# Patient Record
Sex: FEMALE | Race: WHITE
[De-identification: names, ages, dates, MRNs, and addresses within clinical notes are randomized per-mention and may not be internally consistent; named-entity substitution may affect disease eponyms.]

---

## 2017-03-08 ENCOUNTER — APPOINTMENT (OUTPATIENT)
Dept: UROGYNECOLOGY | Facility: CLINIC | Age: 72
End: 2017-03-08

## 2017-03-08 VITALS
SYSTOLIC BLOOD PRESSURE: 160 MMHG | HEART RATE: 74 BPM | TEMPERATURE: 97.7 F | DIASTOLIC BLOOD PRESSURE: 80 MMHG | RESPIRATION RATE: 16 BRPM

## 2017-06-07 ENCOUNTER — APPOINTMENT (OUTPATIENT)
Dept: UROGYNECOLOGY | Facility: CLINIC | Age: 72
End: 2017-06-07

## 2017-06-07 VITALS
SYSTOLIC BLOOD PRESSURE: 160 MMHG | RESPIRATION RATE: 14 BRPM | HEART RATE: 70 BPM | DIASTOLIC BLOOD PRESSURE: 84 MMHG | TEMPERATURE: 98.1 F

## 2017-08-16 ENCOUNTER — APPOINTMENT (OUTPATIENT)
Dept: UROGYNECOLOGY | Facility: CLINIC | Age: 72
End: 2017-08-16
Payer: MEDICARE

## 2017-08-16 PROCEDURE — 99213 OFFICE O/P EST LOW 20 MIN: CPT

## 2017-11-15 ENCOUNTER — APPOINTMENT (OUTPATIENT)
Dept: UROGYNECOLOGY | Facility: CLINIC | Age: 72
End: 2017-11-15
Payer: MEDICARE

## 2017-11-15 PROCEDURE — 99213 OFFICE O/P EST LOW 20 MIN: CPT

## 2018-02-14 ENCOUNTER — APPOINTMENT (OUTPATIENT)
Dept: UROGYNECOLOGY | Facility: CLINIC | Age: 73
End: 2018-02-14
Payer: MEDICARE

## 2018-02-14 PROCEDURE — 99213 OFFICE O/P EST LOW 20 MIN: CPT

## 2018-05-16 ENCOUNTER — APPOINTMENT (OUTPATIENT)
Dept: UROGYNECOLOGY | Facility: CLINIC | Age: 73
End: 2018-05-16
Payer: MEDICARE

## 2018-05-16 PROCEDURE — 99213 OFFICE O/P EST LOW 20 MIN: CPT

## 2018-05-16 PROCEDURE — A4562: CPT

## 2018-08-15 ENCOUNTER — APPOINTMENT (OUTPATIENT)
Dept: UROGYNECOLOGY | Facility: CLINIC | Age: 73
End: 2018-08-15
Payer: MEDICARE

## 2018-08-15 PROCEDURE — 99213 OFFICE O/P EST LOW 20 MIN: CPT

## 2018-11-19 ENCOUNTER — APPOINTMENT (OUTPATIENT)
Dept: UROGYNECOLOGY | Facility: CLINIC | Age: 73
End: 2018-11-19
Payer: MEDICARE

## 2018-11-19 PROCEDURE — 99213 OFFICE O/P EST LOW 20 MIN: CPT

## 2019-02-20 ENCOUNTER — APPOINTMENT (OUTPATIENT)
Dept: UROGYNECOLOGY | Facility: CLINIC | Age: 74
End: 2019-02-20
Payer: MEDICARE

## 2019-02-20 PROCEDURE — 99213 OFFICE O/P EST LOW 20 MIN: CPT

## 2019-02-20 NOTE — DISCUSSION/SUMMARY
[FreeTextEntry1] : F/u 3 months or sooner if needed.  Instructed to call with any questions or concerns and she verbalizes understanding.\par

## 2019-02-20 NOTE — PHYSICAL EXAM
[No Acute Distress] : in no acute distress [Well developed] : well developed [Well Nourished] : ~L well nourished [Good Hygeine] : demonstrates good hygeine [Oriented x3] : oriented to person, place, and time [Normal Memory] : ~T memory was ~L unimpaired [Normal Mood/Affect] : mood and affect are normal [Soft, Nontender] : the abdomen was soft and nontender [Warm and Dry] : was warm and dry to touch [Normal Gait] : gait was normal [Normal Appearance] : general appearance was normal [Atrophy] : atrophy [Rectocele] : a rectocele [Cystocele] : a cystocele [Uterine Prolapse] : uterine prolapse [Discharge] : a  ~M vaginal discharge was present [Scant] : scant [White] : white [No Bleeding] : there was no active vaginal bleeding [Normal] : normal [de-identified] : + pessary dimple at 7:00

## 2019-02-20 NOTE — HISTORY OF PRESENT ILLNESS
[FreeTextEntry1] : Pt. very happy with pessary.  Denies any pelvic pain, pressure or vaginal bleeding.  Denies any problems with urination or BM.  No other changes since last visit.

## 2019-02-20 NOTE — PROCEDURE
[Good Fit] : fits well [Discharge] : there is vaginal discharge [Pessary Inserted] : inserted [Pessary Washed] : washed [H2O] : H2O [None] : no bleeding [Erosion] : no evidence of erosion [Erythema] : no erythema [Infection] : no evidence of infection [FreeTextEntry1] : tory  2 1/2

## 2019-05-22 ENCOUNTER — APPOINTMENT (OUTPATIENT)
Dept: UROGYNECOLOGY | Facility: CLINIC | Age: 74
End: 2019-05-22
Payer: MEDICARE

## 2019-05-22 PROCEDURE — 99213 OFFICE O/P EST LOW 20 MIN: CPT

## 2019-05-22 NOTE — DISCUSSION/SUMMARY
[FreeTextEntry1] : F/u in 3 months or sooner if needed.  Instructed to call with any questions or concerns and she verbalizes understanding.\par

## 2019-05-22 NOTE — PHYSICAL EXAM
[No Acute Distress] : in no acute distress [Well developed] : well developed [Well Nourished] : ~L well nourished [Good Hygeine] : demonstrates good hygeine [Oriented x3] : oriented to person, place, and time [Normal Memory] : ~T memory was ~L unimpaired [Normal Mood/Affect] : mood and affect are normal [Soft, Nontender] : the abdomen was soft and nontender [Warm and Dry] : was warm and dry to touch [Normal Gait] : gait was normal [Normal Appearance] : general appearance was normal [Rectocele] : a rectocele [Cystocele] : a cystocele [Uterine Prolapse] : uterine prolapse [Discharge] : a  ~M vaginal discharge was present [White] : white [Scant] : there was scant vaginal bleeding [Normal] : normal [FreeTextEntry4] : + irritations to right vaginal wall with scant bleeding

## 2019-05-22 NOTE — HISTORY OF PRESENT ILLNESS
[FreeTextEntry1] : Pt. happy with pessary.  reports good support.  Denies any pelvic pain, pressure or vaginal bleeding.  Denies any problems with urination or BM.  Does note some vaginal discharge, not bothersome.

## 2019-05-22 NOTE — PROCEDURE
[Good Fit] : fits well [Erythema] : erythema noted [Discharge] : there is vaginal discharge [Pessary Inserted] : inserted [Pessary Washed] : washed [H2O] : H2O [Mild] : mild bleeding [Resolved w/pressure] : was resolved by applying pressure [Erosion] : no evidence of erosion [Infection] : no evidence of infection [FreeTextEntry1] : tory  2 1/2

## 2019-08-21 ENCOUNTER — APPOINTMENT (OUTPATIENT)
Dept: UROGYNECOLOGY | Facility: CLINIC | Age: 74
End: 2019-08-21
Payer: MEDICARE

## 2019-08-21 PROCEDURE — 99213 OFFICE O/P EST LOW 20 MIN: CPT

## 2019-08-21 NOTE — HISTORY OF PRESENT ILLNESS
[FreeTextEntry1] : Pt. very happy with pessary.  Reports excellent support.  Denies any pelvic pain, pressure or vaginal bleeding.  Denies any problems with urination or BM.

## 2019-08-21 NOTE — DISCUSSION/SUMMARY
[FreeTextEntry1] : F.u 3 months or sooner if needed.  Instructed to call with any questions or concerns and she verbalizes understanding.\par

## 2019-08-21 NOTE — PHYSICAL EXAM
[No Acute Distress] : in no acute distress [Well developed] : well developed [Well Nourished] : ~L well nourished [Good Hygeine] : demonstrates good hygeine [Oriented x3] : oriented to person, place, and time [Normal Memory] : ~T memory was ~L unimpaired [Normal Mood/Affect] : mood and affect are normal [Soft, Nontender] : the abdomen was soft and nontender [Warm and Dry] : was warm and dry to touch [Normal Gait] : gait was normal [Normal Appearance] : general appearance was normal [Rectocele] : a rectocele [Cystocele] : a cystocele [Uterine Prolapse] : uterine prolapse [Discharge] : a  ~M vaginal discharge was present [Scant] : scant [White] : white [No Bleeding] : there was no active vaginal bleeding [Normal] : normal

## 2019-08-21 NOTE — PROCEDURE
[Good Fit] : fits well [Discharge] : there is vaginal discharge [Pessary Inserted] : inserted [H2O] : H2O [Pessary Washed] : washed [None] : no bleeding [Erosion] : no evidence of erosion [Erythema] : no erythema [Infection] : no evidence of infection [FreeTextEntry1] : tory  2 1/2

## 2019-11-20 ENCOUNTER — APPOINTMENT (OUTPATIENT)
Dept: UROGYNECOLOGY | Facility: CLINIC | Age: 74
End: 2019-11-20
Payer: MEDICARE

## 2019-11-20 PROCEDURE — 99213 OFFICE O/P EST LOW 20 MIN: CPT

## 2019-11-20 NOTE — HISTORY OF PRESENT ILLNESS
[FreeTextEntry1] : Pt very happy with pessary.  Denies any pelvic pain, pressure or vaginal bleeding.  Denies any problems with urination or BM.  Denies any other changes since last visit.

## 2019-11-20 NOTE — PHYSICAL EXAM
[No Acute Distress] : in no acute distress [Well developed] : well developed [Well Nourished] : ~L well nourished [Oriented x3] : oriented to person, place, and time [Good Hygeine] : demonstrates good hygeine [Normal Mood/Affect] : mood and affect are normal [Normal Memory] : ~T memory was ~L unimpaired [Warm and Dry] : was warm and dry to touch [Soft, Nontender] : the abdomen was soft and nontender [Normal Gait] : gait was normal [Normal Appearance] : general appearance was normal [Rectocele] : a rectocele [Cystocele] : a cystocele [Discharge] : a  ~M vaginal discharge was present [Uterine Prolapse] : uterine prolapse [Scant] : scant [White] : white [Normal] : normal [No Bleeding] : there was no active vaginal bleeding

## 2019-11-20 NOTE — PROCEDURE
[Good Fit] : fits well [Discharge] : there is vaginal discharge [Pessary Inserted] : inserted [Pessary Washed] : washed [None] : no bleeding [H2O] : H2O [Erosion] : no evidence of erosion [Erythema] : no erythema [Infection] : no evidence of infection [FreeTextEntry1] : tory  2 1/2

## 2020-02-24 ENCOUNTER — APPOINTMENT (OUTPATIENT)
Dept: UROGYNECOLOGY | Facility: CLINIC | Age: 75
End: 2020-02-24
Payer: MEDICARE

## 2020-02-24 PROCEDURE — 99213 OFFICE O/P EST LOW 20 MIN: CPT

## 2020-02-24 NOTE — HISTORY OF PRESENT ILLNESS
[FreeTextEntry1] : Pt very happy with pessary.  Denies any pelvic pain, pressure or vaginal bleeding.  Denies any problems with urination or BM.  She is happy with support and denies any other changes since last visit.

## 2020-02-24 NOTE — PHYSICAL EXAM
[No Acute Distress] : in no acute distress [Well developed] : well developed [Well Nourished] : ~L well nourished [Good Hygeine] : demonstrates good hygeine [Oriented x3] : oriented to person, place, and time [Normal Memory] : ~T memory was ~L unimpaired [Normal Mood/Affect] : mood and affect are normal [Soft, Nontender] : the abdomen was soft and nontender [Warm and Dry] : was warm and dry to touch [Normal Gait] : gait was normal [Normal Appearance] : general appearance was normal [Atrophy] : atrophy [Rectocele] : a rectocele [Cystocele] : a cystocele [Uterine Prolapse] : uterine prolapse [Scant] : scant [Discharge] : a  ~M vaginal discharge was present [White] : white [No Bleeding] : there was no active vaginal bleeding [Normal] : normal

## 2020-02-24 NOTE — PROCEDURE
[Good Fit] : fits well [Discharge] : there is vaginal discharge [Pessary Inserted] : inserted [Pessary Washed] : washed [H2O] : H2O [None] : no bleeding [Erosion] : no evidence of erosion [Erythema] : no erythema [Infection] : no evidence of infection [FreeTextEntry1] : Luis A  2 1/2

## 2020-05-25 ENCOUNTER — NON-APPOINTMENT (OUTPATIENT)
Age: 75
End: 2020-05-25

## 2020-05-26 ENCOUNTER — APPOINTMENT (OUTPATIENT)
Dept: UROGYNECOLOGY | Facility: CLINIC | Age: 75
End: 2020-05-26
Payer: MEDICARE

## 2020-05-26 VITALS
DIASTOLIC BLOOD PRESSURE: 100 MMHG | HEART RATE: 80 BPM | RESPIRATION RATE: 100 BRPM | SYSTOLIC BLOOD PRESSURE: 150 MMHG | HEIGHT: 60 IN | TEMPERATURE: 97.6 F

## 2020-05-26 DIAGNOSIS — N93.9 ABNORMAL UTERINE AND VAGINAL BLEEDING, UNSPECIFIED: ICD-10-CM

## 2020-05-26 DIAGNOSIS — N36.2 URETHRAL CARUNCLE: ICD-10-CM

## 2020-05-26 PROCEDURE — 99213 OFFICE O/P EST LOW 20 MIN: CPT

## 2020-05-26 RX ORDER — ESTRADIOL 0.1 MG/G
0.1 CREAM VAGINAL
Qty: 1 | Refills: 0 | Status: ACTIVE | COMMUNITY
Start: 2020-05-26 | End: 1900-01-01

## 2020-05-26 NOTE — PHYSICAL EXAM
[No Acute Distress] : in no acute distress [Well developed] : well developed [Well Nourished] : ~L well nourished [Good Hygeine] : demonstrates good hygeine [Oriented x3] : oriented to person, place, and time [Normal Memory] : ~T memory was ~L unimpaired [Normal Mood/Affect] : mood and affect are normal [Soft, Nontender] : the abdomen was soft and nontender [Normal Gait] : gait was normal [Warm and Dry] : was warm and dry to touch [Normal Appearance] : general appearance was normal [Atrophy] : atrophy [Rectocele] : a rectocele [Cystocele] : a cystocele [Uterine Prolapse] : uterine prolapse [Discharge] : a  ~M vaginal discharge was present [Scant] : scant [White] : white [No Bleeding] : there was no active vaginal bleeding [Normal] : normal [Chaperone Present] : A chaperone was present in the examining room during all aspects of the physical examination

## 2020-05-26 NOTE — DISCUSSION/SUMMARY
[FreeTextEntry1] : \par No evidence of erosion on exam today. Urethral caruncle present. Will start vaginal estrogen for caruncle and obtain pelvic ultrasound to assess uterus. F/u for removal of pessary prior to US.

## 2020-05-26 NOTE — PROCEDURE
[Good Fit] : fits well [Discharge] : there is vaginal discharge [Pessary Inserted] : inserted [H2O] : H2O [Pessary Washed] : washed [None] : no bleeding [Refit] : refit is not needed [Erosion] : no evidence of erosion [Erythema] : no erythema [Infection] : no evidence of infection [FreeTextEntry1] : Luis A  2 1/2

## 2020-05-26 NOTE — HISTORY OF PRESENT ILLNESS
[FreeTextEntry1] : \kanika Smith has a GH LS 2 1/2. She called yesterday with 2 episodes of vaginal bleeding. Denies dysuria, incomplete emptying, constipation. Pessary was last checked on 2/24/20. Denies any pelvic pain, pressure. Denies any problems with urination or BM. She is happy with support and denies any other changes since last visit.

## 2020-06-03 ENCOUNTER — RESULT REVIEW (OUTPATIENT)
Age: 75
End: 2020-06-03

## 2020-06-03 ENCOUNTER — APPOINTMENT (OUTPATIENT)
Dept: ULTRASOUND IMAGING | Facility: CLINIC | Age: 75
End: 2020-06-03

## 2020-06-03 ENCOUNTER — OUTPATIENT (OUTPATIENT)
Dept: OUTPATIENT SERVICES | Facility: HOSPITAL | Age: 75
LOS: 1 days | End: 2020-06-03
Payer: MEDICARE

## 2020-06-03 ENCOUNTER — APPOINTMENT (OUTPATIENT)
Dept: UROGYNECOLOGY | Facility: CLINIC | Age: 75
End: 2020-06-03
Payer: MEDICARE

## 2020-06-03 VITALS — TEMPERATURE: 97.8 F

## 2020-06-03 DIAGNOSIS — Z00.8 ENCOUNTER FOR OTHER GENERAL EXAMINATION: ICD-10-CM

## 2020-06-03 PROCEDURE — 76830 TRANSVAGINAL US NON-OB: CPT | Mod: 26

## 2020-06-03 PROCEDURE — 99213 OFFICE O/P EST LOW 20 MIN: CPT

## 2020-06-03 PROCEDURE — 76856 US EXAM PELVIC COMPLETE: CPT

## 2020-06-03 PROCEDURE — 76830 TRANSVAGINAL US NON-OB: CPT

## 2020-06-03 PROCEDURE — 76856 US EXAM PELVIC COMPLETE: CPT | Mod: 26

## 2020-06-04 ENCOUNTER — APPOINTMENT (OUTPATIENT)
Dept: UROGYNECOLOGY | Facility: CLINIC | Age: 75
End: 2020-06-04
Payer: MEDICARE

## 2020-06-04 PROCEDURE — 99213 OFFICE O/P EST LOW 20 MIN: CPT

## 2020-06-04 NOTE — PHYSICAL EXAM
[No Acute Distress] : in no acute distress [Well developed] : well developed [Well Nourished] : ~L well nourished [Good Hygeine] : demonstrates good hygeine [Oriented x3] : oriented to person, place, and time [Normal Memory] : ~T memory was ~L unimpaired [Normal Mood/Affect] : mood and affect are normal [Warm and Dry] : was warm and dry to touch [Normal Gait] : gait was normal [Labia Majora] : were normal [Normal] : was normal [Labia Minora] : were normal [Normal Appearance] : general appearance was normal [Atrophy] : atrophy [No Bleeding] : there was no active vaginal bleeding [Anxiety] : patient is not anxious [Tenderness] : ~T no ~M abdominal tenderness observed [Distended] : not distended

## 2020-06-04 NOTE — PROCEDURE
[Good Fit] : fits well [Pessary Out] : removed [H2O] : H2O [None] : no bleeding [Refit] : refit is not needed [Erosion] : no evidence of erosion [Erythema] : no erythema [Discharge] : no vaginal discharge [Infection] : no evidence of infection [FreeTextEntry1] : GH 2 1/2 LS [FreeTextEntry8] : routine mary carmen-care

## 2020-06-04 NOTE — DISCUSSION/SUMMARY
[FreeTextEntry1] : Pessary left out today for transvaginal sonogram to be done later today. She will RTO later this week for pessary to be reinserted.  Pt agrees to call office with any problems/concerns.

## 2020-06-04 NOTE — PROCEDURE
[Good Fit] : fits well [Pessary Inserted] : inserted [H2O] : H2O [None] : no bleeding [Refit] : refit is not needed [Erosion] : no evidence of erosion [Erythema] : no erythema [Discharge] : no vaginal discharge [Infection] : no evidence of infection [FreeTextEntry1] :  2 1/2 LS (inserted today) [FreeTextEntry8] : routine mary carmen-care

## 2020-06-04 NOTE — DISCUSSION/SUMMARY
[FreeTextEntry1] : Discussed results of pelvic sonogram with patient. She is aware that due to the endometrial lining of 13 mm, she will need an endometrial biopsy.  She will RTO for endometrial biopsy as soon as possible.  She will hold off on using low dose vaginal estrogen until biopsy results return. \par \par Pessary reinserted. Pt comfortable with pessary.  She will RTO in 3 months or sooner if needed for f/u of prolapse.\par \par Pt agrees to call office with any problems/concerns.

## 2020-06-04 NOTE — HISTORY OF PRESENT ILLNESS
[FreeTextEntry1] : Pt presents to office for pessary reinsertion.  Pessary was removed for transvaginal sonogram to be done.  Sonogram was done yesterday (6/3/20) and endometrial lining was found to be 13 mm.

## 2020-06-04 NOTE — PHYSICAL EXAM
[Well developed] : well developed [No Acute Distress] : in no acute distress [Well Nourished] : ~L well nourished [Oriented x3] : oriented to person, place, and time [Good Hygeine] : demonstrates good hygeine [Normal Mood/Affect] : mood and affect are normal [Normal Memory] : ~T memory was ~L unimpaired [Warm and Dry] : was warm and dry to touch [Normal Gait] : gait was normal [Labia Majora] : were normal [Labia Minora] : were normal [Normal] : was normal [Normal Appearance] : general appearance was normal [Atrophy] : atrophy [No Bleeding] : there was no active vaginal bleeding [Anxiety] : patient is not anxious [Tenderness] : ~T no ~M abdominal tenderness observed [Distended] : not distended [de-identified] : cervical procidentia

## 2020-06-04 NOTE — HISTORY OF PRESENT ILLNESS
[FreeTextEntry1] : Pt presents to office for removal of pessary prior to transvaginal sonogram to evaluate endometrial lining.  Pt reported 2 separate episodes of vaginal bleeding noted on pad.  Vaginal exam was normal and there were no vaginal irritations.  Pt did have a urethral caruncle, for which she started using low dose vaginal estrogen.  Denies any staining since that last 2 episodes.

## 2020-06-17 ENCOUNTER — APPOINTMENT (OUTPATIENT)
Dept: UROGYNECOLOGY | Facility: CLINIC | Age: 75
End: 2020-06-17
Payer: MEDICARE

## 2020-06-17 ENCOUNTER — OUTPATIENT (OUTPATIENT)
Dept: OUTPATIENT SERVICES | Facility: HOSPITAL | Age: 75
LOS: 1 days | End: 2020-06-17
Payer: MEDICARE

## 2020-06-17 DIAGNOSIS — Z01.818 ENCOUNTER FOR OTHER PREPROCEDURAL EXAMINATION: ICD-10-CM

## 2020-06-17 PROCEDURE — 58100 BIOPSY OF UTERUS LINING: CPT

## 2020-06-19 RX ORDER — DOXYCYCLINE HYCLATE 100 MG/1
100 TABLET ORAL
Qty: 20 | Refills: 0 | Status: ACTIVE | COMMUNITY
Start: 2020-06-19 | End: 1900-01-01

## 2020-06-22 LAB — CORE LAB BIOPSY: NORMAL

## 2020-06-24 ENCOUNTER — APPOINTMENT (OUTPATIENT)
Dept: UROGYNECOLOGY | Facility: CLINIC | Age: 75
End: 2020-06-24

## 2020-07-01 ENCOUNTER — APPOINTMENT (OUTPATIENT)
Dept: UROGYNECOLOGY | Facility: CLINIC | Age: 75
End: 2020-07-01
Payer: MEDICARE

## 2020-07-01 VITALS
DIASTOLIC BLOOD PRESSURE: 103 MMHG | HEART RATE: 69 BPM | TEMPERATURE: 97.6 F | OXYGEN SATURATION: 99 % | SYSTOLIC BLOOD PRESSURE: 178 MMHG

## 2020-07-01 PROCEDURE — 99214 OFFICE O/P EST MOD 30 MIN: CPT

## 2020-07-01 NOTE — PROCEDURE
[Good Fit] : fits well [Pessary Inserted] : inserted [H2O] : H2O [None] : no bleeding [Erosion] : no evidence of erosion [Refit] : refit is not needed [Erythema] : no erythema [Discharge] : no vaginal discharge [FreeTextEntry1] : GH 2 1/2 LS [FreeTextEntry8] : routine mary carmen-care

## 2020-07-01 NOTE — PHYSICAL EXAM
[No Acute Distress] : in no acute distress [Well developed] : well developed [Well Nourished] : ~L well nourished [Good Hygeine] : demonstrates good hygeine [Oriented x3] : oriented to person, place, and time [Normal Memory] : ~T memory was ~L unimpaired [Normal Mood/Affect] : mood and affect are normal [Warm and Dry] : was warm and dry to touch [Normal Gait] : gait was normal [Labia Minora] : were normal [Labia Majora] : were normal [Normal] : was normal [Normal Appearance] : general appearance was normal [Atrophy] : atrophy [No Bleeding] : there was no active vaginal bleeding [Anxiety] : patient is not anxious [Tenderness] : ~T no ~M abdominal tenderness observed [Distended] : not distended

## 2020-07-01 NOTE — DISCUSSION/SUMMARY
[FreeTextEntry1] : Pessary inserted today.  Will plan for repeat pelvic sonogram for f/u of endometrial lining.  She will have repeat pelvic sonogram in 1 month.  She will RTO in 1 month for pessary removal prior to pelvic sonogram or sooner if needed. Pt agrees to call office with any problems/concerns.

## 2020-07-01 NOTE — HISTORY OF PRESENT ILLNESS
[FreeTextEntry1] : Pt presents to office for pessary reinsertion.  Pessary left out due to vaginal bleeding.  Pelvic sonogram done 6/3/20, showed thickened endometrial lining (13 mm) possibly due to endometritis. she is s/p 10 days of Doxycycline 100 mg BID.  Repeat pelvic sonogram done yesterday and lining is down to 6 mm.  Incidentally, b/l grade 2-3 hydronephrosis noted which resolved after pt voided.

## 2020-08-04 ENCOUNTER — APPOINTMENT (OUTPATIENT)
Dept: UROGYNECOLOGY | Facility: CLINIC | Age: 75
End: 2020-08-04
Payer: MEDICARE

## 2020-08-04 DIAGNOSIS — R93.89 ABNORMAL FINDINGS ON DIAGNOSTIC IMAGING OF OTHER SPECIFIED BODY STRUCTURES: ICD-10-CM

## 2020-08-04 PROCEDURE — 99213 OFFICE O/P EST LOW 20 MIN: CPT

## 2020-08-07 NOTE — PHYSICAL EXAM
[No Acute Distress] : in no acute distress [Well developed] : well developed [Well Nourished] : ~L well nourished [Good Hygeine] : demonstrates good hygeine [Oriented x3] : oriented to person, place, and time [Normal Memory] : ~T memory was ~L unimpaired [Normal Mood/Affect] : mood and affect are normal [Warm and Dry] : was warm and dry to touch [Normal Gait] : gait was normal [Labia Majora] : were normal [Labia Minora] : were normal [Normal] : was normal [Normal Appearance] : general appearance was normal [Atrophy] : atrophy [No Bleeding] : there was no active vaginal bleeding [Distended] : not distended [Tenderness] : ~T no ~M abdominal tenderness observed [Anxiety] : patient is not anxious

## 2020-08-07 NOTE — PROCEDURE
[Good Fit] : fits well [Pessary Out] : removed [H2O] : H2O [None] : no bleeding [Erosion] : no evidence of erosion [Refit] : refit is not needed [Discharge] : no vaginal discharge [Erythema] : no erythema [Infection] : no evidence of infection [FreeTextEntry1] : GH 2 1/2 LS [FreeTextEntry8] : routine mary carmen-care

## 2020-08-07 NOTE — DISCUSSION/SUMMARY
[FreeTextEntry1] : Pessary left out today for transvaginal sonogram.  She will RTO for insertion later today.  She agrees to call office with any problems/concerns.

## 2020-08-07 NOTE — HISTORY OF PRESENT ILLNESS
[FreeTextEntry1] : Pt presents to office for removal of pessary prior to pelvic sonogram.  She is having a pelvic sonogram to f/u on endometrial lining thickness, s/p course of doxycycline for endometritis.

## 2020-08-28 ENCOUNTER — APPOINTMENT (OUTPATIENT)
Dept: UROGYNECOLOGY | Facility: CLINIC | Age: 75
End: 2020-08-28

## 2020-11-06 ENCOUNTER — APPOINTMENT (OUTPATIENT)
Dept: UROGYNECOLOGY | Facility: CLINIC | Age: 75
End: 2020-11-06

## 2020-11-09 ENCOUNTER — APPOINTMENT (OUTPATIENT)
Dept: UROGYNECOLOGY | Facility: CLINIC | Age: 75
End: 2020-11-09
Payer: MEDICARE

## 2020-11-09 PROCEDURE — 99213 OFFICE O/P EST LOW 20 MIN: CPT

## 2020-11-09 NOTE — HISTORY OF PRESENT ILLNESS
[FreeTextEntry1] : Pt w/ known hx POP supported by RHONDA LS # 2 1/2 pessary presents to office today for routine f/u care.  Pt is happy with support provided by pessary.  Pt states occasionally will feel pessary slipping down, but it spontaneously moves back up when she sits or rests without any issues.  Feels empty after voids.  Denies any constipation.

## 2020-11-09 NOTE — END OF VISIT
[FreeTextEntry3] : I have reviewed the above and agree with all pertinent clinical information including history and physical examination and agree with treatment plan.\par

## 2020-11-09 NOTE — PHYSICAL EXAM
[No Acute Distress] : in no acute distress [Well developed] : well developed [Well Nourished] : ~L well nourished [Good Hygeine] : demonstrates good hygeine [Oriented x3] : oriented to person, place, and time [Normal Mood/Affect] : mood and affect are normal [Warm and Dry] : was warm and dry to touch [Normal Gait] : gait was normal [No Invol. Movements] : no involuntary movements were seen [Labia Majora] : were normal [Labia Minora] : were normal [Rectocele] : a rectocele [Cystocele] : a cystocele [Uterine Prolapse] : uterine prolapse [Discharge] : a  ~M vaginal discharge was present [Scant] : scant [White] : white [Thin] : thin [Mucoid] : mucoid [No Bleeding] : there was no active vaginal bleeding [Anxiety] : patient is not anxious [Tenderness] : ~T no ~M abdominal tenderness observed [Distended] : not distended [Foul Smelling] : not foul smelling [de-identified] : no visible prolapse or pessary bulging at introitus [FreeTextEntry4] : pessary intact in posterior vault

## 2020-11-09 NOTE — DISCUSSION/SUMMARY
[FreeTextEntry1] : x12 weeks f/u POP and pessary care or sooner prn\par Advised avoid constipation/straining w/ daily fiber/fluid intake and stool softeners daily PRN\par Instructed pt to call the office if any problems or concerns and she verbalized understanding.\par

## 2020-11-09 NOTE — PROCEDURE
[Good Fit] : fits well [Discharge] : there is vaginal discharge [Pessary Inserted] : inserted [Pessary Washed] : washed [H2O] : H2O [Medication Review] : Medicaiton Review: Patient verbalizes understanding of risks and benefits [Fluid Management] : Fluid Management: patient verbalizes understanding 6-10 cups per day [Bowel Management] : Bowel Management: patient verbalizes understanding of daily dietary fiber intake [Bladder Training] : Bladder Training: Patient given information with verbal understanding [Refit] : refit is not needed [Erosion] : no evidence of erosion [Erythema] : no erythema [Infection] : no evidence of infection [FreeTextEntry1] : RHONDA GIBBS # 2 1/2 [de-identified] : scant white leukorrhea [FreeTextEntry4] : Advised avoid constipation/straining w/ daily fiber/fluid intake and stool softeners daily PRN [FreeTextEntry8] : Con't daily proper pericare

## 2020-11-30 ENCOUNTER — APPOINTMENT (OUTPATIENT)
Dept: UROGYNECOLOGY | Facility: CLINIC | Age: 75
End: 2020-11-30
Payer: MEDICARE

## 2020-11-30 DIAGNOSIS — N39.3 STRESS INCONTINENCE (FEMALE) (MALE): ICD-10-CM

## 2020-11-30 PROCEDURE — 99213 OFFICE O/P EST LOW 20 MIN: CPT

## 2020-11-30 RX ORDER — AMLODIPINE BESYLATE 5 MG/1
5 TABLET ORAL
Qty: 90 | Refills: 0 | Status: DISCONTINUED | COMMUNITY
Start: 2020-10-15

## 2020-11-30 RX ORDER — ENALAPRIL MALEATE 10 MG/1
10 TABLET ORAL
Qty: 90 | Refills: 0 | Status: DISCONTINUED | COMMUNITY
Start: 2019-11-13

## 2020-11-30 NOTE — PHYSICAL EXAM
[No Acute Distress] : in no acute distress [Well developed] : well developed [Well Nourished] : ~L well nourished [Good Hygeine] : demonstrates good hygeine [Oriented x3] : oriented to person, place, and time [Normal Mood/Affect] : mood and affect are normal [Warm and Dry] : was warm and dry to touch [Normal Gait] : gait was normal [No Invol. Movements] : no involuntary movements were seen [Vulvar Atrophy] : vulvar atrophy [Labia Majora] : were normal [Labia Minora] : were normal [Atrophy] : atrophy [Rectocele] : a rectocele [Cystocele] : a cystocele [Uterine Prolapse] : uterine prolapse [Discharge] : a  ~M vaginal discharge was present [Scant] : scant [White] : white [Thin] : thin [Mucoid] : mucoid [No Bleeding] : there was no active vaginal bleeding [Anxiety] : patient is not anxious [Tenderness] : ~T no ~M abdominal tenderness observed [Distended] : not distended [Foul Smelling] : not foul smelling [de-identified] : Yoania.

## 2020-11-30 NOTE — PROCEDURE
[Refit] : refit needed [Discharge] : there is vaginal discharge [Pessary Inserted] : inserted [Pessary Washed] : washed [H2O] : H2O [Medication Review] : Medicaiton Review: Patient verbalizes understanding of risks and benefits [Fluid Management] : Fluid Management: patient verbalizes understanding 6-10 cups per day [Bowel Management] : Bowel Management: patient verbalizes understanding of daily dietary fiber intake [Bladder Training] : Bladder Training: Patient given information with verbal understanding [Good Fit] : fit is not good [Erosion] : no evidence of erosion [Erythema] : no erythema [Infection] : no evidence of infection [FreeTextEntry1] : RHONDA GIBBS # 2 1/2 [de-identified] : Luis A LS # 2 1/2.   [de-identified] : Luis A GIBBS # 2 3/4. [de-identified] : scant white leukorrhea [FreeTextEntry3] : Luvena or Replens to vaginal area 2-3x a week. [FreeTextEntry4] : Advised avoid constipation/straining w/ daily fiber/fluid intake and stool.  PT may take softeners and Miralx daily [FreeTextEntry8] : Con't daily proper pericare

## 2020-11-30 NOTE — HISTORY OF PRESENT ILLNESS
[FreeTextEntry1] : Pt presents to the office to have her pessary reinserted at it had fallen out over the weekend.  PT was fitted with Gellhorn LS # 2 1/2.  She had a large BM.  Pt is constipated.  Not taking any BM regimen.  She is able to urinate.  Prolapse protruding out of vagina and uncomfortable.

## 2020-11-30 NOTE — DISCUSSION/SUMMARY
[FreeTextEntry1] : Fitted back with Gellhorn LS # 2 3/4.  Follow up in 2-3 months.  \par Advised avoid constipation/straining w/ daily fiber/fluid intake and stool softeners daily PRN\par Instructed pt to call the office if any problems or concerns and she verbalized understanding.\par

## 2021-02-09 ENCOUNTER — APPOINTMENT (OUTPATIENT)
Dept: UROGYNECOLOGY | Facility: CLINIC | Age: 76
End: 2021-02-09
Payer: MEDICARE

## 2021-02-09 VITALS — SYSTOLIC BLOOD PRESSURE: 180 MMHG | HEART RATE: 71 BPM | DIASTOLIC BLOOD PRESSURE: 91 MMHG

## 2021-02-09 VITALS — TEMPERATURE: 95.6 F

## 2021-02-09 PROCEDURE — 99214 OFFICE O/P EST MOD 30 MIN: CPT

## 2021-02-16 NOTE — PROCEDURE
[Pessary Inserted] : inserted [Pessary Washed] : washed [H2O] : H2O [Medication Review] : Medicaiton Review: Patient verbalizes understanding of risks and benefits [Fluid Management] : Fluid Management: patient verbalizes understanding 6-10 cups per day [Bowel Management] : Bowel Management: patient verbalizes understanding of daily dietary fiber intake [Bladder Training] : Bladder Training: Patient given information with verbal understanding [Mild] : mild bleeding [Resolved w/pressure] : was resolved by applying pressure [Refit] : refit is not needed [Good Fit] : fit is not good [Erosion] : no evidence of erosion [Erythema] : no erythema [Discharge] : no vaginal discharge [Infection] : no evidence of infection [de-identified] : from irritation at inferior forchette d/t removal of pessary, resolved w/ pressure applied using scopette [FreeTextEntry1] : RHONDA LS # 2 3/4 [FreeTextEntry3] : Luvena or Replens to vaginal area 2-3x a week. [FreeTextEntry4] : Advised avoid constipation/straining w/ daily fiber/fluid intake and stool.  PT may take softeners and Miralx daily [FreeTextEntry8] : Con't daily proper pericare

## 2021-02-16 NOTE — DISCUSSION/SUMMARY
[FreeTextEntry1] : x12 weeks f/u POP and pessary care or sooner prn\par Advised avoid constipation/straining w/ daily fiber/fluid intake and stool softeners daily PRN\par Elevated BP-pt did not take BP med yesterday and today.  Pt asymptomatic.  Advised pt further evaluation at ER and pt declined.  Pt states she is going straight home to take her BP medication and will repeat BP check w/ home monitor.  Instructed pt if BP persistently elevated or if any symptoms, must contact her PCP immediately or go to nearest ER and she verbalized understanding.\par Instructed pt to call the office if any problems or concerns and she verbalized understanding.\par

## 2021-02-16 NOTE — HISTORY OF PRESENT ILLNESS
[FreeTextEntry1] : Pt w/ known hx POP refitted with GH LS # 2 3/4 pessary presents to office today for routine f/u care.  Pt is happy with support provided by this pessary and denies any issues with it.  Feels empty after voids.  Denies any constipation with Colace PRN.  Pt reports she did not take BP medication yesterday b/c ran out of Rx and she picked up refill last night, but forgot to take this AM also.  Denies any HA, dizziness, LOC, visual changes, CP, SOB, Abd pain, N/V, parasthesias.  Pt states she "feels fine"

## 2021-02-16 NOTE — PHYSICAL EXAM
[No Acute Distress] : in no acute distress [Well developed] : well developed [Well Nourished] : ~L well nourished [Good Hygeine] : demonstrates good hygeine [Oriented x3] : oriented to person, place, and time [Normal Mood/Affect] : mood and affect are normal [Warm and Dry] : was warm and dry to touch [Normal Gait] : gait was normal [No Invol. Movements] : no involuntary movements were seen [Vulvar Atrophy] : vulvar atrophy [Labia Majora] : were normal [Labia Minora] : were normal [Atrophy] : atrophy [Rectocele] : a rectocele [Uterine Prolapse] : uterine prolapse [Cystocele] : a cystocele [Scant] : there was scant vaginal bleeding [Anxiety] : patient is not anxious [Tenderness] : ~T no ~M abdominal tenderness observed [Distended] : not distended [de-identified] : no visible prolapse or pessary bulging at introitus [FreeTextEntry4] : pessary intact in posterior vault

## 2021-05-11 ENCOUNTER — APPOINTMENT (OUTPATIENT)
Dept: UROGYNECOLOGY | Facility: CLINIC | Age: 76
End: 2021-05-11
Payer: MEDICARE

## 2021-05-11 VITALS — SYSTOLIC BLOOD PRESSURE: 109 MMHG | DIASTOLIC BLOOD PRESSURE: 78 MMHG | HEART RATE: 76 BPM | TEMPERATURE: 97.3 F

## 2021-05-11 PROCEDURE — 99213 OFFICE O/P EST LOW 20 MIN: CPT

## 2021-05-13 NOTE — HISTORY OF PRESENT ILLNESS
[FreeTextEntry1] : Pt w/ known hx POP supported by  LS # 2 3/4 pessary presents to office today for routine f/u care. Pt is happy with support provided by pessary and denies any issues with it. Feels empty after voids. Reports rare constipation and takes Miralax PRN with relief.

## 2021-05-13 NOTE — PROCEDURE
[Erythema] : erythema noted [Discharge] : there is vaginal discharge [Pessary Inserted] : inserted [Pessary Washed] : washed [H2O] : H2O [Medication Review] : Medicaiton Review: Patient verbalizes understanding of risks and benefits [Fluid Management] : Fluid Management: patient verbalizes understanding 6-10 cups per day [Bowel Management] : Bowel Management: patient verbalizes understanding of daily dietary fiber intake [Bladder Training] : Bladder Training: Patient given information with verbal understanding [Good Fit] : fit is not good [Refit] : refit is not needed [Erosion] : no evidence of erosion [Infection] : no evidence of infection [FreeTextEntry1] : RHONDA LS # 2 3/4 [de-identified] : mild at anterior cervical lip [de-identified] : scant white leukorrhea [FreeTextEntry3] : Luvena or Replens to vaginal area 2-3x a week. [FreeTextEntry4] : Advised avoid constipation/straining w/ daily fiber/fluid intake and stool.  PT may take softeners and Miralx daily [FreeTextEntry8] : Con't daily proper pericare

## 2021-05-13 NOTE — DISCUSSION/SUMMARY
[FreeTextEntry1] : x12 weeks f/u POP and pessary care or sooner prn\par Advised OTC Replens vaginal cream PV BIW HS PRN\par Advised avoid constipation/straining w/ daily fiber/fluid intake and stool softeners daily PRN\par Con't daily proper pericare\par Instructed pt to call the office if any problems or concerns and she verbalized understanding.\par

## 2021-05-13 NOTE — PHYSICAL EXAM
[No Acute Distress] : in no acute distress [Well developed] : well developed [Well Nourished] : ~L well nourished [Good Hygeine] : demonstrates good hygeine [Oriented x3] : oriented to person, place, and time [Normal Mood/Affect] : mood and affect are normal [Warm and Dry] : was warm and dry to touch [Normal Gait] : gait was normal [Vulvar Atrophy] : vulvar atrophy [Labia Majora] : were normal [Labia Minora] : were normal [Atrophy] : atrophy [Erythematous] : erythema [Rectocele] : a rectocele [Cystocele] : a cystocele [Uterine Prolapse] : uterine prolapse [Discharge] : a  ~M vaginal discharge was present [Scant] : scant [White] : white [Thin] : thin [Mucoid] : mucoid [Anxiety] : patient is not anxious [Tenderness] : ~T no ~M abdominal tenderness observed [Distended] : not distended [Foul Smelling] : not foul smelling [de-identified] : no visible prolapse or pessary bulging at introitus [FreeTextEntry4] : pessary intact in posterior vault

## 2021-08-09 ENCOUNTER — APPOINTMENT (OUTPATIENT)
Dept: UROGYNECOLOGY | Facility: CLINIC | Age: 76
End: 2021-08-09
Payer: MEDICARE

## 2021-08-09 VITALS — DIASTOLIC BLOOD PRESSURE: 92 MMHG | TEMPERATURE: 97 F | SYSTOLIC BLOOD PRESSURE: 167 MMHG

## 2021-08-09 DIAGNOSIS — Z46.89 ENCOUNTER FOR FITTING AND ADJUSTMENT OF OTHER SPECIFIED DEVICES: ICD-10-CM

## 2021-08-09 PROCEDURE — 99213 OFFICE O/P EST LOW 20 MIN: CPT

## 2021-08-09 NOTE — PROCEDURE
[Good Fit] : fit is not good [Refit] : refit is not needed [Erosion] : no evidence of erosion [Erythema] : no erythema [Discharge] : no vaginal discharge [Infection] : no evidence of infection [Pessary Inserted] : inserted [Pessary Washed] : washed [H2O] : H2O [None] : no bleeding [Medication Review] : Medicaiton Review: Patient verbalizes understanding of risks and benefits [Fluid Management] : Fluid Management: patient verbalizes understanding 6-10 cups per day [Bowel Management] : Bowel Management: patient verbalizes understanding of daily dietary fiber intake [Bladder Training] : Bladder Training: Patient given information with verbal understanding [FreeTextEntry1] : RHONDA LS # 2 3/4 [FreeTextEntry3] : Luvena or Replens PV HS 2x a week PRN [FreeTextEntry4] : Advised avoid constipation/straining w/ daily fiber/fluid intake and stool.  PT may take softeners and Miralx daily [FreeTextEntry8] : Con't daily proper pericare

## 2021-08-09 NOTE — DISCUSSION/SUMMARY
[FreeTextEntry1] : x12 weeks f/u POP and pessary care or sooner prn\par Advised OTC Replens vaginal cream PV BIW HS PRN\par Con't daily proper pericare\par Advised avoid constipation/straining w/ daily fiber/fluid intake and stool softeners daily PRN\par Instructed pt to call the office if any problems or concerns and she verbalized understanding.\par

## 2021-08-09 NOTE — PHYSICAL EXAM
[No Acute Distress] : in no acute distress [Well developed] : well developed [Well Nourished] : ~L well nourished [Good Hygeine] : demonstrates good hygeine [Oriented x3] : oriented to person, place, and time [Normal Mood/Affect] : mood and affect are normal [Anxiety] : patient is not anxious [Tenderness] : ~T no ~M abdominal tenderness observed [Distended] : not distended [Warm and Dry] : was warm and dry to touch [Normal Gait] : gait was normal [Vulvar Atrophy] : vulvar atrophy [Labia Majora] : were normal [Labia Minora] : were normal [Atrophy] : atrophy [Rectocele] : a rectocele [Cystocele] : a cystocele [Uterine Prolapse] : uterine prolapse [No Bleeding] : there was no active vaginal bleeding [de-identified] : no visible prolapse or pessary bulging at introitus [FreeTextEntry4] : pessary intact in posterior vault

## 2021-08-09 NOTE — HISTORY OF PRESENT ILLNESS
[FreeTextEntry1] : Pt w/ known hx POP refitted with GH LS # 2 3/4 pessary presents to office today for routine f/u care.  Pt is happy with support provided by this pessary and denies any issues with it.  Feels empty after voids.  Denies any constipation with daily fluid, fruits and vegetables and Colace PRN.  Pt had forgotten to purchase Replens vaginal cream as recommended at prior visit.

## 2021-10-16 ENCOUNTER — INPATIENT (INPATIENT)
Facility: HOSPITAL | Age: 76
LOS: 3 days | Discharge: HOME | DRG: 281 | End: 2021-10-20
Attending: INTERNAL MEDICINE | Admitting: INTERNAL MEDICINE
Payer: MEDICARE

## 2021-10-16 VITALS
DIASTOLIC BLOOD PRESSURE: 91 MMHG | TEMPERATURE: 98 F | HEART RATE: 113 BPM | OXYGEN SATURATION: 99 % | RESPIRATION RATE: 18 BRPM | HEIGHT: 60 IN | SYSTOLIC BLOOD PRESSURE: 124 MMHG | WEIGHT: 115.96 LBS

## 2021-10-16 DIAGNOSIS — I21.4 NON-ST ELEVATION (NSTEMI) MYOCARDIAL INFARCTION: ICD-10-CM

## 2021-10-16 LAB
ALBUMIN SERPL ELPH-MCNC: 4.8 G/DL — SIGNIFICANT CHANGE UP (ref 3.3–5)
ALP SERPL-CCNC: 49 U/L — SIGNIFICANT CHANGE UP (ref 40–120)
ALT FLD-CCNC: 24 U/L — SIGNIFICANT CHANGE UP (ref 10–45)
ANION GAP SERPL CALC-SCNC: 18 MMOL/L — HIGH (ref 5–17)
APTT BLD: 27.5 SEC — SIGNIFICANT CHANGE UP (ref 27.5–35.5)
APTT BLD: 65.7 SEC — HIGH (ref 27.5–35.5)
AST SERPL-CCNC: 43 U/L — HIGH (ref 10–40)
BASOPHILS # BLD AUTO: 0.03 K/UL — SIGNIFICANT CHANGE UP (ref 0–0.2)
BASOPHILS NFR BLD AUTO: 0.3 % — SIGNIFICANT CHANGE UP (ref 0–2)
BILIRUB SERPL-MCNC: 0.6 MG/DL — SIGNIFICANT CHANGE UP (ref 0.2–1.2)
BUN SERPL-MCNC: 24 MG/DL — HIGH (ref 7–23)
CALCIUM SERPL-MCNC: 9.8 MG/DL — SIGNIFICANT CHANGE UP (ref 8.4–10.5)
CHLORIDE SERPL-SCNC: 101 MMOL/L — SIGNIFICANT CHANGE UP (ref 96–108)
CK MB BLD-MCNC: 6.9 % — HIGH (ref 0–3.5)
CK MB CFR SERPL CALC: 10.2 NG/ML — HIGH (ref 0–3.8)
CK SERPL-CCNC: 148 U/L — SIGNIFICANT CHANGE UP (ref 25–170)
CO2 SERPL-SCNC: 20 MMOL/L — LOW (ref 22–31)
CREAT SERPL-MCNC: 0.84 MG/DL — SIGNIFICANT CHANGE UP (ref 0.5–1.3)
EOSINOPHIL # BLD AUTO: 0.01 K/UL — SIGNIFICANT CHANGE UP (ref 0–0.5)
EOSINOPHIL NFR BLD AUTO: 0.1 % — SIGNIFICANT CHANGE UP (ref 0–6)
GAS PNL BLDV: SIGNIFICANT CHANGE UP
GAS PNL BLDV: SIGNIFICANT CHANGE UP
GLUCOSE SERPL-MCNC: 110 MG/DL — HIGH (ref 70–99)
HCT VFR BLD CALC: 33.9 % — LOW (ref 34.5–45)
HCT VFR BLD CALC: 35.4 % — SIGNIFICANT CHANGE UP (ref 34.5–45)
HGB BLD-MCNC: 11.8 G/DL — SIGNIFICANT CHANGE UP (ref 11.5–15.5)
HGB BLD-MCNC: 12.2 G/DL — SIGNIFICANT CHANGE UP (ref 11.5–15.5)
IMM GRANULOCYTES NFR BLD AUTO: 0.4 % — SIGNIFICANT CHANGE UP (ref 0–1.5)
INR BLD: 0.96 RATIO — SIGNIFICANT CHANGE UP (ref 0.88–1.16)
LYMPHOCYTES # BLD AUTO: 1.92 K/UL — SIGNIFICANT CHANGE UP (ref 1–3.3)
LYMPHOCYTES # BLD AUTO: 18.6 % — SIGNIFICANT CHANGE UP (ref 13–44)
MAGNESIUM SERPL-MCNC: 2 MG/DL — SIGNIFICANT CHANGE UP (ref 1.6–2.6)
MCHC RBC-ENTMCNC: 32.2 PG — SIGNIFICANT CHANGE UP (ref 27–34)
MCHC RBC-ENTMCNC: 32.4 PG — SIGNIFICANT CHANGE UP (ref 27–34)
MCHC RBC-ENTMCNC: 34.5 GM/DL — SIGNIFICANT CHANGE UP (ref 32–36)
MCHC RBC-ENTMCNC: 34.8 GM/DL — SIGNIFICANT CHANGE UP (ref 32–36)
MCV RBC AUTO: 92.4 FL — SIGNIFICANT CHANGE UP (ref 80–100)
MCV RBC AUTO: 93.9 FL — SIGNIFICANT CHANGE UP (ref 80–100)
MONOCYTES # BLD AUTO: 0.57 K/UL — SIGNIFICANT CHANGE UP (ref 0–0.9)
MONOCYTES NFR BLD AUTO: 5.5 % — SIGNIFICANT CHANGE UP (ref 2–14)
NEUTROPHILS # BLD AUTO: 7.73 K/UL — HIGH (ref 1.8–7.4)
NEUTROPHILS NFR BLD AUTO: 75.1 % — SIGNIFICANT CHANGE UP (ref 43–77)
NRBC # BLD: 0 /100 WBCS — SIGNIFICANT CHANGE UP (ref 0–0)
NRBC # BLD: 0 /100 WBCS — SIGNIFICANT CHANGE UP (ref 0–0)
NT-PROBNP SERPL-SCNC: 7075 PG/ML — HIGH (ref 0–300)
PHOSPHATE SERPL-MCNC: 2.8 MG/DL — SIGNIFICANT CHANGE UP (ref 2.5–4.5)
PLATELET # BLD AUTO: 204 K/UL — SIGNIFICANT CHANGE UP (ref 150–400)
PLATELET # BLD AUTO: 218 K/UL — SIGNIFICANT CHANGE UP (ref 150–400)
POTASSIUM SERPL-MCNC: 4 MMOL/L — SIGNIFICANT CHANGE UP (ref 3.5–5.3)
POTASSIUM SERPL-SCNC: 4 MMOL/L — SIGNIFICANT CHANGE UP (ref 3.5–5.3)
PROT SERPL-MCNC: 6.9 G/DL — SIGNIFICANT CHANGE UP (ref 6–8.3)
PROTHROM AB SERPL-ACNC: 11.5 SEC — SIGNIFICANT CHANGE UP (ref 10.6–13.6)
RBC # BLD: 3.67 M/UL — LOW (ref 3.8–5.2)
RBC # BLD: 3.77 M/UL — LOW (ref 3.8–5.2)
RBC # FLD: 12.3 % — SIGNIFICANT CHANGE UP (ref 10.3–14.5)
RBC # FLD: 12.4 % — SIGNIFICANT CHANGE UP (ref 10.3–14.5)
SARS-COV-2 RNA SPEC QL NAA+PROBE: SIGNIFICANT CHANGE UP
SODIUM SERPL-SCNC: 139 MMOL/L — SIGNIFICANT CHANGE UP (ref 135–145)
TROPONIN T, HIGH SENSITIVITY RESULT: 323 NG/L — HIGH (ref 0–51)
TROPONIN T, HIGH SENSITIVITY RESULT: 324 NG/L — HIGH (ref 0–51)
TROPONIN T, HIGH SENSITIVITY RESULT: 439 NG/L — HIGH (ref 0–51)
WBC # BLD: 10.3 K/UL — SIGNIFICANT CHANGE UP (ref 3.8–10.5)
WBC # BLD: 9.39 K/UL — SIGNIFICANT CHANGE UP (ref 3.8–10.5)
WBC # FLD AUTO: 10.3 K/UL — SIGNIFICANT CHANGE UP (ref 3.8–10.5)
WBC # FLD AUTO: 9.39 K/UL — SIGNIFICANT CHANGE UP (ref 3.8–10.5)

## 2021-10-16 PROCEDURE — 93010 ELECTROCARDIOGRAM REPORT: CPT | Mod: GC

## 2021-10-16 PROCEDURE — 99291 CRITICAL CARE FIRST HOUR: CPT | Mod: GC

## 2021-10-16 PROCEDURE — 71045 X-RAY EXAM CHEST 1 VIEW: CPT | Mod: 26

## 2021-10-16 RX ORDER — TICAGRELOR 90 MG/1
180 TABLET ORAL ONCE
Refills: 0 | Status: COMPLETED | OUTPATIENT
Start: 2021-10-16 | End: 2021-10-16

## 2021-10-16 RX ORDER — ASPIRIN/CALCIUM CARB/MAGNESIUM 324 MG
324 TABLET ORAL ONCE
Refills: 0 | Status: COMPLETED | OUTPATIENT
Start: 2021-10-16 | End: 2021-10-16

## 2021-10-16 RX ORDER — HEPARIN SODIUM 5000 [USP'U]/ML
3200 INJECTION INTRAVENOUS; SUBCUTANEOUS EVERY 6 HOURS
Refills: 0 | Status: DISCONTINUED | OUTPATIENT
Start: 2021-10-16 | End: 2021-10-19

## 2021-10-16 RX ORDER — HEPARIN SODIUM 5000 [USP'U]/ML
3200 INJECTION INTRAVENOUS; SUBCUTANEOUS ONCE
Refills: 0 | Status: DISCONTINUED | OUTPATIENT
Start: 2021-10-16 | End: 2021-10-19

## 2021-10-16 RX ORDER — METOPROLOL TARTRATE 50 MG
25 TABLET ORAL DAILY
Refills: 0 | Status: DISCONTINUED | OUTPATIENT
Start: 2021-10-16 | End: 2021-10-19

## 2021-10-16 RX ORDER — SODIUM CHLORIDE 9 MG/ML
500 INJECTION, SOLUTION INTRAVENOUS ONCE
Refills: 0 | Status: COMPLETED | OUTPATIENT
Start: 2021-10-16 | End: 2021-10-16

## 2021-10-16 RX ORDER — HEPARIN SODIUM 5000 [USP'U]/ML
INJECTION INTRAVENOUS; SUBCUTANEOUS
Qty: 25000 | Refills: 0 | Status: DISCONTINUED | OUTPATIENT
Start: 2021-10-16 | End: 2021-10-18

## 2021-10-16 RX ORDER — ATORVASTATIN CALCIUM 80 MG/1
40 TABLET, FILM COATED ORAL AT BEDTIME
Refills: 0 | Status: DISCONTINUED | OUTPATIENT
Start: 2021-10-16 | End: 2021-10-20

## 2021-10-16 RX ORDER — TICAGRELOR 90 MG/1
90 TABLET ORAL EVERY 12 HOURS
Refills: 0 | Status: DISCONTINUED | OUTPATIENT
Start: 2021-10-16 | End: 2021-10-19

## 2021-10-16 RX ORDER — AMLODIPINE BESYLATE 2.5 MG/1
5 TABLET ORAL DAILY
Refills: 0 | Status: DISCONTINUED | OUTPATIENT
Start: 2021-10-16 | End: 2021-10-19

## 2021-10-16 RX ADMIN — SODIUM CHLORIDE 500 MILLILITER(S): 9 INJECTION, SOLUTION INTRAVENOUS at 12:14

## 2021-10-16 RX ADMIN — HEPARIN SODIUM 650 UNIT(S)/HR: 5000 INJECTION INTRAVENOUS; SUBCUTANEOUS at 22:57

## 2021-10-16 RX ADMIN — HEPARIN SODIUM 3200 UNIT(S): 5000 INJECTION INTRAVENOUS; SUBCUTANEOUS at 16:30

## 2021-10-16 RX ADMIN — HEPARIN SODIUM 650 UNIT(S)/HR: 5000 INJECTION INTRAVENOUS; SUBCUTANEOUS at 16:32

## 2021-10-16 RX ADMIN — TICAGRELOR 90 MILLIGRAM(S): 90 TABLET ORAL at 22:21

## 2021-10-16 RX ADMIN — Medication 324 MILLIGRAM(S): at 13:05

## 2021-10-16 RX ADMIN — TICAGRELOR 180 MILLIGRAM(S): 90 TABLET ORAL at 15:47

## 2021-10-16 NOTE — ED PROVIDER NOTE - PHYSICAL EXAMINATION
Gen: WDWN, NAD  HEENT: EOMI, no nasal discharge, mucous membranes moist  CV: 2+ radial pulses b/l  Resp: no accessory muscle use, no increased work of breathing  GI: Abdomen soft non-distended, NTTP  MSK: No open wounds, no bruising, no LE edema  Neuro: A&Ox4, following commands, moving all four extremities spontaneously  Psych: appropriate mood

## 2021-10-16 NOTE — ED PROVIDER NOTE - OBJECTIVE STATEMENT
77YO F hx of HTN p/w generalized weakness. 2d prior had 1x emesis, generalized weakness, then returned to baseline health except for b/l LE weakness. pt denies cp, sob. presented to clinic for f/u, ekg showing deep inverted T waves. pt has no cardiac hx, in past had holter monitor that was normal. no hx of smoking, no cardiac hx in family.

## 2021-10-16 NOTE — ED PROVIDER NOTE - ATTENDING CONTRIBUTION TO CARE
attending Mariza: 76yF h/o HTN p/w generalized weakness x 2 days. Initially with strong urge to defecate with large BM, followed by "indigestion" with nausea and one episode of vomiting. Seen by PMD today where she was found to have abnormal EKG. Denies chest pain, SOB, syncope. No smoking history. No family h/o CAD. EKG with deep inverted T waves anterolaterally. Will place on tele, obtain serial EKG, labs including trop, cardiology eval in ED

## 2021-10-16 NOTE — ED PROVIDER NOTE - CLINICAL SUMMARY MEDICAL DECISION MAKING FREE TEXT BOX
Valeria Chavarria MD, PGY-2: 77YO F hx of HTN p/w generalized weakness and recent EKG showing deeply inverted T waves, no cp or sob. VSS, PE unremarkable. consider atypical ACS, plan for basic labs, trop, ekg, likely cdu vs. admission for stress/echo.

## 2021-10-16 NOTE — ED ADULT NURSE NOTE - NS ED NURSE REPORT GIVEN TO FT
Pt received bed assignment. Pt aware. Report given to RNСветлана. VSS. Pt stable for transport. Chart given to charge desk. Will cont to monitor.

## 2021-10-16 NOTE — ED ADULT NURSE NOTE - OBJECTIVE STATEMENT
75 yo F with pmhx of   was sent in by her PMD after showing EKG changes. Pt had an episode of faintness on Thursday, 10/14, did not synopsize, but states she had frequent BMs and felt "off". Did get evaluated at the time. Pt is asymptomatic on arrival. denies chest pain, shortness of breath, headache, nausea, vomiting, diarrhea, abdominal pain, fevers, chills, urinary symptoms, hematuria, bloody stool, falls, traumas. Lungs cta, NSR on CCM, abd soft, nt/nd.

## 2021-10-16 NOTE — ED ADULT TRIAGE NOTE - CHIEF COMPLAINT QUOTE
went to doctor and was sent to ed; was feeling sweaty and like I had to have a bowel movement and lightheaded; doctor said ekg changes

## 2021-10-16 NOTE — H&P ADULT - NSHPLABSRESULTS_GEN_ALL_CORE
12.2   10.30 )-----------( 218      ( 16 Oct 2021 12:04 )             35.4       10-16    139  |  101  |  24<H>  ----------------------------<  110<H>  4.0   |  20<L>  |  0.84    Ca    9.8      16 Oct 2021 12:04  Phos  2.8     10-16  Mg     2.0     10-16    TPro  6.9  /  Alb  4.8  /  TBili  0.6  /  DBili  x   /  AST  43<H>  /  ALT  24  /  AlkPhos  49  10-16          < from: Xray Chest 1 View- PORTABLE-Urgent (Xray Chest 1 View- PORTABLE-Urgent .) (10.16.21 @ 13:21) >    IMPRESSION:  Clear lungs.    < end of copied text >    EKG SR T inv ant lat leads

## 2021-10-16 NOTE — H&P ADULT - ASSESSMENT
76 f with    Chest pain  - telemetry  - cardiac enzymes  - BB  - ASA  - Brillinta  - AC with Heparin  - Cardiology evaluation     HTN control    DVT prophylaxis    Further action as per clinical course     Ruben Suarez MD pager [phone]

## 2021-10-16 NOTE — H&P ADULT - NSHPPHYSICALEXAM_GEN_ALL_CORE
PHYSICAL EXAMINATION:  Vital Signs Last 24 Hrs  T(C): 36.7 (16 Oct 2021 12:18), Max: 36.7 (16 Oct 2021 12:18)  T(F): 98.1 (16 Oct 2021 12:18), Max: 98.1 (16 Oct 2021 12:18)  HR: 88 (16 Oct 2021 15:32) (88 - 113)  BP: 126/72 (16 Oct 2021 15:32) (122/95 - 126/72)  BP(mean): --  RR: 18 (16 Oct 2021 15:32) (18 - 18)  SpO2: 99% (16 Oct 2021 15:32) (99% - 99%)  CAPILLARY BLOOD GLUCOSE          GENERAL: NAD, well-groomed, well-developed  HEAD:  atraumatic, normocephalic  EYES: sclera anicteric  ENMT: mucous membranes moist  NECK: supple, No JVD  CHEST/LUNG: clear to auscultation bilaterally; no rales, rhonchi, or wheezing b/l  HEART: normal S1, S2  ABDOMEN: BS+, soft, ND, NT   EXTREMITIES:  pulses palpable; no clubbing, cyanosis, or edema b/l LEs  NEURO: awake, alert, interactive; moves all extremities  SKIN: no rashes or lesions

## 2021-10-16 NOTE — ED PROVIDER NOTE - PROGRESS NOTE DETAILS
Valeria Chavarria MD, PGY-2: spoke with cards, asa and brillinta ordered, getting weight for heparin

## 2021-10-16 NOTE — ED PROVIDER NOTE - NS ED ROS FT
Gen: Denies fevers  CV: Denies chest pain  Resp: Denies SOB, cough  GI: Denies nausea, vomiting  Msk: + b/l LE weakness  : Denies dysuria  all other ROS negative unless indicated in HPI

## 2021-10-16 NOTE — ED ADULT NURSE REASSESSMENT NOTE - NS ED NURSE REASSESS COMMENT FT1
intial bolus heparing dose 3200 units was given Second RN Monica at bedside to confirm, I accidentally clicked the PRN order of 3200 on zebra device. RN Sparkle on 4 mon made aware. pt had episode of Atrial Tachy on monitor, RN sparkle aware.

## 2021-10-16 NOTE — CONSULT NOTE ADULT - ASSESSMENT
75 yo F with hx of HTN was sent in from PCP with TWI in precordial leads after presenting to PCP with weakness, found to have troponin of 324.    #EKG changes  #Troponin elevation  - given that patient never had any chest pain, it's unconvincing for ACS, however, also no demand event that would potentially cause troponin elevation  - check CK, CKMB, and trend  - trend trop to peak  - STAT TTE communicated to echo lab  - EKG with TWI in precordial leads, DDx include Takotsubo's  - would start on aspirin and hold off on plavix  - start toprol 25 daily  - check lipids, a1c  - start atorvastatin 40 daily  - monitor on telemetry  - would need ischemic eval eventually   77 yo F with hx of HTN was sent in from PCP with TWI in precordial leads after presenting to PCP with weakness, found to have troponin of 324.    #EKG changes  #Troponin elevation  - given that patient never had any chest pain, it's unconvincing for ACS, however, also no demand event that would potentially cause troponin elevation  - check CK, CKMB, and trend  - trend trop to peak  - STAT TTE communicated to echo lab  - EKG with TWI in precordial leads, DDx include late presenting ACS and Takotsubo's  - would start on aspirin and hold off on plavix  - start toprol 25 daily  - check lipids, a1c  - start atorvastatin 40 daily  - monitor on telemetry  - would need ischemic eval eventually   75 yo F with hx of HTN was sent in from PCP with TWI in precordial leads after presenting to PCP with weakness, found to have troponin of 324.    #EKG changes  #Troponin elevation  - given that patient never had any chest pain, it's unconvincing for ACS, however, also no demand event that would potentially cause troponin elevation  - check CK, CKMB, and trend  - trend trop to peak  - STAT TTE communicated to echo lab  - EKG with TWI in precordial leads, DDx include late presenting ACS and Takotsubo's  - would DAPT load if not already done so, start heparin gtt  - start toprol 25 daily  - check lipids, a1c  - start atorvastatin 40 daily  - monitor on telemetry  - would need ischemic eval eventually

## 2021-10-16 NOTE — CONSULT NOTE ADULT - ATTENDING COMMENTS
Patient seen and examined. Agree with assessment and plan as outlined above. While story is not classic, ECG, trop and echo are concerning. While stress CM is in the differential, ACS must be excluded. Patient should have cardiac cath. She denied symptoms at the time of the interview. Continue GDMT for NSTEMI.

## 2021-10-16 NOTE — CONSULT NOTE ADULT - SUBJECTIVE AND OBJECTIVE BOX
Patient seen and evaluated at bedside    Chief Complaint: weakness    HPI:  77 yo F with hx of HTN presented with generalized weakness. 2 days ago she had 1 episode of emesis and returned to baseline health except for bilateral LE weakness. She went to her PCP today for check up and EKG showed TWI in the precordial leads with some LEONARD in v2. Patient denies chest pain, SOB. She has no known cardiac hx. In the past she has had a holter monitor that was normal. In the ED, her EKG again with TWI in precordial leads and her troponin was found to be 324.     PMHx:   HTN (hypertension)    Allergies:  No Known Allergies    REVIEW OF SYSTEMS:  Constitutional:     [x ] negative [ ] fevers [ ] chills [ ] weight loss [ ] weight gain  HEENT:                  [x ] negative [ ] dry eyes [ ] eye irritation [ ] postnasal drip [ ] nasal congestion  CV:                         [ x] negative  [ ] chest pain [ ] orthopnea [ ] palpitations [ ] murmur  Resp:                     [x ] negative [ ] cough [ ] shortness of breath [ ] dyspnea [ ] wheezing [ ] sputum [ ]hemoptysis  GI:                          [ x] negative [ ] nausea [ ] vomiting [ ] diarrhea [ ] constipation [ ] abd pain [ ] dysphagia   :                        [ x] negative [ ] dysuria [ ] nocturia [ ] hematuria [ ] increased urinary frequency  Musculoskeletal: [x ] negative [ ] back pain [ ] myalgias [ ] arthralgias [ ] fracture  Skin:                       [ x] negative [ ] rash [ ] itch  Neurological:        [ x] negative [ ] headache [ ] dizziness [ ] syncope [ ] weakness [ ] numbness  Psychiatric:           [ x] negative [ ] anxiety [ ] depression  Endocrine:            [ x] negative [ ] diabetes [ ] thyroid problem  Heme/Lymph:      [ x] negative [ ] anemia [ ] bleeding problem  Allergic/Immune: [ x] negative [ ] itchy eyes [ ] nasal discharge [ ] hives [ ] angioedema    [ x] All other systems negative  [ ] Unable to assess ROS due to    Physical Exam:  T(F): 98.1 (10-16), Max: 98.1 (10-16)  HR: 93 (10-16) (93 - 113)  BP: 122/95 (10-16) (122/95 - 124/91)  RR: 18 (10-16)  SpO2: 99% (10-16)  GENERAL: No acute distress, well-developed  HEAD:  Atraumatic, Normocephalic  ENT: EOMI, PERRLA, conjunctiva and sclera clear, Neck supple, No JVD, moist mucosa  CHEST/LUNG: Clear to auscultation bilaterally; No wheeze, equal breath sounds bilaterally   BACK: No spinal tenderness  HEART: Regular rate and rhythm; No murmurs, rubs, or gallops  ABDOMEN: Soft, Nontender, Nondistended; Bowel sounds present  EXTREMITIES:  No clubbing, cyanosis, or edema  PSYCH: Nl behavior, nl affect  NEUROLOGY: AAOx3, non-focal, cranial nerves intact  SKIN: Normal color, No rashes or lesions  LINES:    Cardiovascular Diagnostic Testing:    ECG: Personally reviewed:    Imaging:    CXR: Personally reviewed    Labs: Personally reviewed                        12.2   10.30 )-----------( 218      ( 16 Oct 2021 12:04 )             35.4     10-16    139  |  101  |  24<H>  ----------------------------<  110<H>  4.0   |  20<L>  |  0.84    Ca    9.8      16 Oct 2021 12:04  Phos  2.8     10-16  Mg     2.0     10-16    TPro  6.9  /  Alb  4.8  /  TBili  0.6  /  DBili  x   /  AST  43<H>  /  ALT  24  /  AlkPhos  49  10-16             Patient seen and evaluated at bedside    Chief Complaint: weakness    HPI:  77 yo F with hx of HTN presented with generalized weakness. 2 days ago she had 1 episode of emesis and returned to baseline health except for bilateral LE weakness. During that episode, she also felt diaphoretic and some abdominal discomfort, however, after she threw up, she was asymptomatic after about 3 mins. She went to her PCP today for check up and EKG showed TWI in the precordial leads with some LEONARD in v2. Patient denies chest pain, SOB. She has no known cardiac hx. In the past she has had a holter monitor that was normal. In the ED, her EKG again with TWI in precordial leads and her troponin was found to be 324.     PMHx:   HTN (hypertension)    Allergies:  No Known Allergies    REVIEW OF SYSTEMS:  Constitutional:     [x ] negative [ ] fevers [ ] chills [ ] weight loss [ ] weight gain  HEENT:                  [x ] negative [ ] dry eyes [ ] eye irritation [ ] postnasal drip [ ] nasal congestion  CV:                         [ x] negative  [ ] chest pain [ ] orthopnea [ ] palpitations [ ] murmur  Resp:                     [x ] negative [ ] cough [ ] shortness of breath [ ] dyspnea [ ] wheezing [ ] sputum [ ]hemoptysis  GI:                          [ x] negative [ ] nausea [ ] vomiting [ ] diarrhea [ ] constipation [ ] abd pain [ ] dysphagia   :                        [ x] negative [ ] dysuria [ ] nocturia [ ] hematuria [ ] increased urinary frequency  Musculoskeletal: [x ] negative [ ] back pain [ ] myalgias [ ] arthralgias [ ] fracture  Skin:                       [ x] negative [ ] rash [ ] itch  Neurological:        [ x] negative [ ] headache [ ] dizziness [ ] syncope [ ] weakness [ ] numbness  Psychiatric:           [ x] negative [ ] anxiety [ ] depression  Endocrine:            [ x] negative [ ] diabetes [ ] thyroid problem  Heme/Lymph:      [ x] negative [ ] anemia [ ] bleeding problem  Allergic/Immune: [ x] negative [ ] itchy eyes [ ] nasal discharge [ ] hives [ ] angioedema    [ x] All other systems negative  [ ] Unable to assess ROS due to    Physical Exam:  T(F): 98.1 (10-16), Max: 98.1 (10-16)  HR: 93 (10-16) (93 - 113)  BP: 122/95 (10-16) (122/95 - 124/91)  RR: 18 (10-16)  SpO2: 99% (10-16)  GENERAL: No acute distress, well-developed  HEAD:  Atraumatic, Normocephalic  ENT: EOMI, PERRLA, conjunctiva and sclera clear, Neck supple, No JVD, moist mucosa  CHEST/LUNG: Clear to auscultation bilaterally; No wheeze, equal breath sounds bilaterally   BACK: No spinal tenderness  HEART: Regular rate and rhythm; No murmurs, rubs, or gallops  ABDOMEN: Soft, Nontender, Nondistended; Bowel sounds present  EXTREMITIES:  No clubbing, cyanosis, or edema  PSYCH: Nl behavior, nl affect  NEUROLOGY: AAOx3, non-focal, cranial nerves intact  SKIN: Normal color, No rashes or lesions  LINES:    Cardiovascular Diagnostic Testing:    ECG: Personally reviewed:    Imaging:    CXR: Personally reviewed    Labs: Personally reviewed                        12.2   10.30 )-----------( 218      ( 16 Oct 2021 12:04 )             35.4     10-16    139  |  101  |  24<H>  ----------------------------<  110<H>  4.0   |  20<L>  |  0.84    Ca    9.8      16 Oct 2021 12:04  Phos  2.8     10-16  Mg     2.0     10-16    TPro  6.9  /  Alb  4.8  /  TBili  0.6  /  DBili  x   /  AST  43<H>  /  ALT  24  /  AlkPhos  49  10-16

## 2021-10-17 LAB
ANION GAP SERPL CALC-SCNC: 15 MMOL/L — SIGNIFICANT CHANGE UP (ref 5–17)
APTT BLD: 47.9 SEC — HIGH (ref 27.5–35.5)
APTT BLD: 54.1 SEC — HIGH (ref 27.5–35.5)
APTT BLD: 62.5 SEC — HIGH (ref 27.5–35.5)
BUN SERPL-MCNC: 15 MG/DL — SIGNIFICANT CHANGE UP (ref 7–23)
CALCIUM SERPL-MCNC: 9.2 MG/DL — SIGNIFICANT CHANGE UP (ref 8.4–10.5)
CHLORIDE SERPL-SCNC: 104 MMOL/L — SIGNIFICANT CHANGE UP (ref 96–108)
CO2 SERPL-SCNC: 21 MMOL/L — LOW (ref 22–31)
COVID-19 SPIKE DOMAIN AB INTERP: POSITIVE
COVID-19 SPIKE DOMAIN ANTIBODY RESULT: 125 U/ML — HIGH
CREAT SERPL-MCNC: 0.71 MG/DL — SIGNIFICANT CHANGE UP (ref 0.5–1.3)
GLUCOSE SERPL-MCNC: 107 MG/DL — HIGH (ref 70–99)
HCT VFR BLD CALC: 35.1 % — SIGNIFICANT CHANGE UP (ref 34.5–45)
HCV AB S/CO SERPL IA: 0.2 S/CO — SIGNIFICANT CHANGE UP (ref 0–0.99)
HCV AB SERPL-IMP: SIGNIFICANT CHANGE UP
HGB BLD-MCNC: 12.1 G/DL — SIGNIFICANT CHANGE UP (ref 11.5–15.5)
MCHC RBC-ENTMCNC: 32.4 PG — SIGNIFICANT CHANGE UP (ref 27–34)
MCHC RBC-ENTMCNC: 34.5 GM/DL — SIGNIFICANT CHANGE UP (ref 32–36)
MCV RBC AUTO: 94.1 FL — SIGNIFICANT CHANGE UP (ref 80–100)
NRBC # BLD: 0 /100 WBCS — SIGNIFICANT CHANGE UP (ref 0–0)
PLATELET # BLD AUTO: 213 K/UL — SIGNIFICANT CHANGE UP (ref 150–400)
POTASSIUM SERPL-MCNC: 3.9 MMOL/L — SIGNIFICANT CHANGE UP (ref 3.5–5.3)
POTASSIUM SERPL-SCNC: 3.9 MMOL/L — SIGNIFICANT CHANGE UP (ref 3.5–5.3)
RBC # BLD: 3.73 M/UL — LOW (ref 3.8–5.2)
RBC # FLD: 12.2 % — SIGNIFICANT CHANGE UP (ref 10.3–14.5)
SARS-COV-2 IGG+IGM SERPL QL IA: 125 U/ML — HIGH
SARS-COV-2 IGG+IGM SERPL QL IA: POSITIVE
SODIUM SERPL-SCNC: 140 MMOL/L — SIGNIFICANT CHANGE UP (ref 135–145)
TROPONIN T, HIGH SENSITIVITY RESULT: 436 NG/L — HIGH (ref 0–51)
WBC # BLD: 8.1 K/UL — SIGNIFICANT CHANGE UP (ref 3.8–10.5)
WBC # FLD AUTO: 8.1 K/UL — SIGNIFICANT CHANGE UP (ref 3.8–10.5)

## 2021-10-17 PROCEDURE — 99223 1ST HOSP IP/OBS HIGH 75: CPT | Mod: GC

## 2021-10-17 RX ADMIN — HEPARIN SODIUM 750 UNIT(S)/HR: 5000 INJECTION INTRAVENOUS; SUBCUTANEOUS at 23:27

## 2021-10-17 RX ADMIN — TICAGRELOR 90 MILLIGRAM(S): 90 TABLET ORAL at 17:22

## 2021-10-17 RX ADMIN — HEPARIN SODIUM 750 UNIT(S)/HR: 5000 INJECTION INTRAVENOUS; SUBCUTANEOUS at 08:00

## 2021-10-17 RX ADMIN — TICAGRELOR 90 MILLIGRAM(S): 90 TABLET ORAL at 05:43

## 2021-10-17 RX ADMIN — Medication 25 MILLIGRAM(S): at 05:43

## 2021-10-17 RX ADMIN — AMLODIPINE BESYLATE 5 MILLIGRAM(S): 2.5 TABLET ORAL at 05:43

## 2021-10-17 RX ADMIN — Medication 10 MILLIGRAM(S): at 05:43

## 2021-10-17 RX ADMIN — HEPARIN SODIUM 750 UNIT(S)/HR: 5000 INJECTION INTRAVENOUS; SUBCUTANEOUS at 15:33

## 2021-10-17 NOTE — PROGRESS NOTE ADULT - ASSESSMENT
76 f with    MI/ Chest pain  - telemetry  - BB  - ASA  - Brillinta  - AC with Heparin  - Cardiology follow  - Cath pending     HTN control    DVT prophylaxis      Ruben Suarez MD pager [phone]

## 2021-10-17 NOTE — PROGRESS NOTE ADULT - SUBJECTIVE AND OBJECTIVE BOX
Patient is a 76y old  Female who presents with a chief complaint of     SUBJECTIVE / OVERNIGHT EVENTS: No new complaints.   Review of Systems  chest pain no  palpitations no  sob no  nausea no  headache no    MEDICATIONS  (STANDING):  amLODIPine   Tablet 5 milliGRAM(s) Oral daily  atorvastatin 40 milliGRAM(s) Oral at bedtime  enalapril 10 milliGRAM(s) Oral daily  heparin   Injectable 3200 Unit(s) IV Push once  heparin  Infusion.  Unit(s)/Hr (6.5 mL/Hr) IV Continuous <Continuous>  metoprolol succinate ER 25 milliGRAM(s) Oral daily  ticagrelor 90 milliGRAM(s) Oral every 12 hours    MEDICATIONS  (PRN):  heparin   Injectable 3200 Unit(s) IV Push every 6 hours PRN For aPTT less than 40      Vital Signs Last 24 Hrs  T(C): 36.6 (17 Oct 2021 12:01), Max: 36.7 (16 Oct 2021 18:51)  T(F): 97.9 (17 Oct 2021 12:01), Max: 98.1 (16 Oct 2021 18:51)  HR: 87 (17 Oct 2021 12:01) (78 - 88)  BP: 116/82 (17 Oct 2021 12:01) (116/82 - 135/97)  BP(mean): --  RR: 18 (17 Oct 2021 12:01) (18 - 18)  SpO2: 97% (17 Oct 2021 12:01) (97% - 99%)    PHYSICAL EXAM:  GENERAL: NAD, well-developed  HEAD:  Atraumatic, Normocephalic  EYES: EOMI, PERRLA, conjunctiva and sclera clear  NECK: Supple, No JVD  CHEST/LUNG: Clear to auscultation bilaterally; No wheeze  HEART: Regular rate and rhythm; No murmurs, rubs, or gallops  ABDOMEN: Soft, Nontender, Nondistended; Bowel sounds present  EXTREMITIES:  2+ Peripheral Pulses, No clubbing, cyanosis, or edema  PSYCH: AAOx3  NEUROLOGY: non-focal  SKIN: No rashes or lesions    LABS:                        12.1   8.10  )-----------( 213      ( 17 Oct 2021 06:52 )             35.1     10-17    140  |  104  |  15  ----------------------------<  107<H>  3.9   |  21<L>  |  0.71    Ca    9.2      17 Oct 2021 06:52  Phos  2.8     10-16  Mg     2.0     10-16    TPro  6.9  /  Alb  4.8  /  TBili  0.6  /  DBili  x   /  AST  43<H>  /  ALT  24  /  AlkPhos  49  10-16    PT/INR - ( 16 Oct 2021 16:21 )   PT: 11.5 sec;   INR: 0.96 ratio         PTT - ( 17 Oct 2021 06:53 )  PTT:47.9 sec  CARDIAC MARKERS ( 16 Oct 2021 21:05 )  x     / x     / 148 U/L / x     / 10.2 ng/mL            RADIOLOGY & ADDITIONAL TESTS:    Imaging Personally Reviewed:    Consultant(s) Notes Reviewed:      Care Discussed with Consultants/Other Providers:

## 2021-10-18 LAB
ALBUMIN SERPL ELPH-MCNC: 4.2 G/DL — SIGNIFICANT CHANGE UP (ref 3.3–5)
ALP SERPL-CCNC: 49 U/L — SIGNIFICANT CHANGE UP (ref 40–120)
ALT FLD-CCNC: 18 U/L — SIGNIFICANT CHANGE UP (ref 10–45)
ANION GAP SERPL CALC-SCNC: 17 MMOL/L — SIGNIFICANT CHANGE UP (ref 5–17)
APTT BLD: 62.2 SEC — HIGH (ref 27.5–35.5)
AST SERPL-CCNC: 23 U/L — SIGNIFICANT CHANGE UP (ref 10–40)
BILIRUB SERPL-MCNC: 0.8 MG/DL — SIGNIFICANT CHANGE UP (ref 0.2–1.2)
BUN SERPL-MCNC: 20 MG/DL — SIGNIFICANT CHANGE UP (ref 7–23)
CALCIUM SERPL-MCNC: 9.2 MG/DL — SIGNIFICANT CHANGE UP (ref 8.4–10.5)
CHLORIDE SERPL-SCNC: 102 MMOL/L — SIGNIFICANT CHANGE UP (ref 96–108)
CK MB BLD-MCNC: 5.5 % — HIGH (ref 0–3.5)
CK MB CFR SERPL CALC: 4.1 NG/ML — HIGH (ref 0–3.8)
CK SERPL-CCNC: 75 U/L — SIGNIFICANT CHANGE UP (ref 25–170)
CO2 SERPL-SCNC: 21 MMOL/L — LOW (ref 22–31)
CREAT SERPL-MCNC: 0.69 MG/DL — SIGNIFICANT CHANGE UP (ref 0.5–1.3)
GLUCOSE SERPL-MCNC: 105 MG/DL — HIGH (ref 70–99)
HCT VFR BLD CALC: 33.9 % — LOW (ref 34.5–45)
HGB BLD-MCNC: 11.8 G/DL — SIGNIFICANT CHANGE UP (ref 11.5–15.5)
MAGNESIUM SERPL-MCNC: 2 MG/DL — SIGNIFICANT CHANGE UP (ref 1.6–2.6)
MCHC RBC-ENTMCNC: 32.4 PG — SIGNIFICANT CHANGE UP (ref 27–34)
MCHC RBC-ENTMCNC: 34.8 GM/DL — SIGNIFICANT CHANGE UP (ref 32–36)
MCV RBC AUTO: 93.1 FL — SIGNIFICANT CHANGE UP (ref 80–100)
NRBC # BLD: 0 /100 WBCS — SIGNIFICANT CHANGE UP (ref 0–0)
PLATELET # BLD AUTO: 223 K/UL — SIGNIFICANT CHANGE UP (ref 150–400)
POTASSIUM SERPL-MCNC: 3.4 MMOL/L — LOW (ref 3.5–5.3)
POTASSIUM SERPL-SCNC: 3.4 MMOL/L — LOW (ref 3.5–5.3)
PROT SERPL-MCNC: 6.3 G/DL — SIGNIFICANT CHANGE UP (ref 6–8.3)
RBC # BLD: 3.64 M/UL — LOW (ref 3.8–5.2)
RBC # FLD: 12.1 % — SIGNIFICANT CHANGE UP (ref 10.3–14.5)
SODIUM SERPL-SCNC: 140 MMOL/L — SIGNIFICANT CHANGE UP (ref 135–145)
TROPONIN T, HIGH SENSITIVITY RESULT: 591 NG/L — HIGH (ref 0–51)
WBC # BLD: 9.01 K/UL — SIGNIFICANT CHANGE UP (ref 3.8–10.5)
WBC # FLD AUTO: 9.01 K/UL — SIGNIFICANT CHANGE UP (ref 3.8–10.5)

## 2021-10-18 PROCEDURE — 99233 SBSQ HOSP IP/OBS HIGH 50: CPT | Mod: GC

## 2021-10-18 PROCEDURE — 99152 MOD SED SAME PHYS/QHP 5/>YRS: CPT

## 2021-10-18 PROCEDURE — 93458 L HRT ARTERY/VENTRICLE ANGIO: CPT | Mod: 26

## 2021-10-18 RX ORDER — SENNA PLUS 8.6 MG/1
2 TABLET ORAL AT BEDTIME
Refills: 0 | Status: DISCONTINUED | OUTPATIENT
Start: 2021-10-18 | End: 2021-10-20

## 2021-10-18 RX ORDER — HEPARIN SODIUM 5000 [USP'U]/ML
750 INJECTION INTRAVENOUS; SUBCUTANEOUS
Qty: 25000 | Refills: 0 | Status: DISCONTINUED | OUTPATIENT
Start: 2021-10-18 | End: 2021-10-19

## 2021-10-18 RX ORDER — SODIUM CHLORIDE 9 MG/ML
250 INJECTION INTRAMUSCULAR; INTRAVENOUS; SUBCUTANEOUS ONCE
Refills: 0 | Status: COMPLETED | OUTPATIENT
Start: 2021-10-18 | End: 2021-10-18

## 2021-10-18 RX ORDER — POTASSIUM CHLORIDE 20 MEQ
40 PACKET (EA) ORAL ONCE
Refills: 0 | Status: COMPLETED | OUTPATIENT
Start: 2021-10-18 | End: 2021-10-18

## 2021-10-18 RX ORDER — POLYETHYLENE GLYCOL 3350 17 G/17G
17 POWDER, FOR SOLUTION ORAL ONCE
Refills: 0 | Status: DISCONTINUED | OUTPATIENT
Start: 2021-10-18 | End: 2021-10-20

## 2021-10-18 RX ADMIN — TICAGRELOR 90 MILLIGRAM(S): 90 TABLET ORAL at 05:46

## 2021-10-18 RX ADMIN — TICAGRELOR 90 MILLIGRAM(S): 90 TABLET ORAL at 18:32

## 2021-10-18 RX ADMIN — Medication 25 MILLIGRAM(S): at 05:46

## 2021-10-18 RX ADMIN — SENNA PLUS 2 TABLET(S): 8.6 TABLET ORAL at 21:21

## 2021-10-18 RX ADMIN — AMLODIPINE BESYLATE 5 MILLIGRAM(S): 2.5 TABLET ORAL at 05:46

## 2021-10-18 RX ADMIN — Medication 10 MILLIGRAM(S): at 05:46

## 2021-10-18 RX ADMIN — SODIUM CHLORIDE 750 MILLILITER(S): 9 INJECTION INTRAMUSCULAR; INTRAVENOUS; SUBCUTANEOUS at 17:15

## 2021-10-18 RX ADMIN — HEPARIN SODIUM 750 UNIT(S)/HR: 5000 INJECTION INTRAVENOUS; SUBCUTANEOUS at 08:53

## 2021-10-18 RX ADMIN — Medication 40 MILLIEQUIVALENT(S): at 11:20

## 2021-10-18 RX ADMIN — HEPARIN SODIUM 750 UNIT(S)/HR: 5000 INJECTION INTRAVENOUS; SUBCUTANEOUS at 23:26

## 2021-10-18 NOTE — PROGRESS NOTE ADULT - SUBJECTIVE AND OBJECTIVE BOX
Patient is a 76y old  Female who presents with a chief complaint of     SUBJECTIVE / OVERNIGHT EVENTS: No new complaints.   Review of Systems  chest pain no  palpitations no  sob no  nausea no  headache no    MEDICATIONS  (STANDING):  amLODIPine   Tablet 5 milliGRAM(s) Oral daily  atorvastatin 40 milliGRAM(s) Oral at bedtime  enalapril 10 milliGRAM(s) Oral daily  heparin   Injectable 3200 Unit(s) IV Push once  heparin  Infusion.  Unit(s)/Hr (6.5 mL/Hr) IV Continuous <Continuous>  metoprolol succinate ER 25 milliGRAM(s) Oral daily  polyethylene glycol 3350 17 Gram(s) Oral once  senna 2 Tablet(s) Oral at bedtime  ticagrelor 90 milliGRAM(s) Oral every 12 hours    MEDICATIONS  (PRN):  heparin   Injectable 3200 Unit(s) IV Push every 6 hours PRN For aPTT less than 40      Vital Signs Last 24 Hrs  T(C): 36.6 (18 Oct 2021 15:05), Max: 36.9 (18 Oct 2021 04:08)  T(F): 97.9 (18 Oct 2021 15:05), Max: 98.4 (18 Oct 2021 04:08)  HR: 77 (18 Oct 2021 17:05) (63 - 78)  BP: 84/68 (18 Oct 2021 17:05) (84/68 - 124/87)  BP(mean): 72 (18 Oct 2021 17:05) (72 - 90)  RR: 18 (18 Oct 2021 17:05) (17 - 18)  SpO2: 96% (18 Oct 2021 17:05) (95% - 99%)    PHYSICAL EXAM:  GENERAL: NAD, well-developed  HEAD:  Atraumatic, Normocephalic  EYES: EOMI, PERRLA, conjunctiva and sclera clear  NECK: Supple, No JVD  CHEST/LUNG: Clear to auscultation bilaterally; No wheeze  HEART: Regular rate and rhythm; No murmurs, rubs, or gallops  ABDOMEN: Soft, Nontender, Nondistended; Bowel sounds present  EXTREMITIES:  2+ Peripheral Pulses, No clubbing, cyanosis, or edema  PSYCH: AAOx3  NEUROLOGY: non-focal  SKIN: No rashes or lesions    LABS:                        11.8   9.01  )-----------( 223      ( 18 Oct 2021 07:19 )             33.9     10-18    140  |  102  |  20  ----------------------------<  105<H>  3.4<L>   |  21<L>  |  0.69    Ca    9.2      18 Oct 2021 07:17  Mg     2.0     10-18    TPro  6.3  /  Alb  4.2  /  TBili  0.8  /  DBili  x   /  AST  23  /  ALT  18  /  AlkPhos  49  10-18    PTT - ( 18 Oct 2021 07:19 )  PTT:62.2 sec  CARDIAC MARKERS ( 18 Oct 2021 07:17 )  x     / x     / 75 U/L / x     / 4.1 ng/mL  CARDIAC MARKERS ( 16 Oct 2021 21:05 )  x     / x     / 148 U/L / x     / 10.2 ng/mL            RADIOLOGY & ADDITIONAL TESTS:    Imaging Personally Reviewed:    Consultant(s) Notes Reviewed:      Care Discussed with Consultants/Other Providers:

## 2021-10-18 NOTE — PROVIDER CONTACT NOTE (OTHER) - ACTION/TREATMENT ORDERED:
Provider notified, d/w fellow from cath lab, pending order for heparin gtt to be restarted 6 hours, night RN made aware to follow up, will continue to monitor.

## 2021-10-18 NOTE — PROGRESS NOTE ADULT - ASSESSMENT
76 f with    MI/ Chest pain  - telemetry  - BB  - ASA  - Brillinta  - AC with Heparin   - Cardiology follow  - Cath pending     HTN control    DVT prophylaxis      Ruben Suarez MD pager [phone]

## 2021-10-18 NOTE — PROGRESS NOTE ADULT - ATTENDING COMMENTS
76 year old woman with atypical symptoms, now without chest pain, denies dyspnea. ECG, troponin and echo are concerning. Stress cardiomyoapathy is a consideration, but need coronary angiography to exclude acute coronary occlusive disease. It is scheduled today.    To contact call Cardiology Fellow or Attending as listed on [email or web address] password: [email or web address].

## 2021-10-18 NOTE — PROGRESS NOTE ADULT - ASSESSMENT
77 yo F with hx of HTN was sent in from PCP with recent stress and new EKG changes with biphasic TWI in the precordial leads. TTE with apical akinesis and hyperdynamic base.     -cardiac enzymes cont to trend up, despite cp, given the risk factors and EKG changes, plan for LHC today with Dr. Hawkins (added on).   -cont asa, brilinta and heparin gtt   -cont toprol XL 25 mg QD   -DC ACE-i, woud restart post LHC   -DC amlodipine to allow for more room of uptitration of HF GDMT     Explained to the pt at length regarding the plan and rationale behind it. Pt is agreeable.     Thank you for allowing us to participate in the care of your patient. If you have any questions or concerns please do not hesitate to contact us 24/7.   All Cardiology service information can be found 24/7 on [email or web address], password: [email or web address] MD  PGY-5 Cardiology Fellow, [facility]/LEONELA

## 2021-10-19 LAB
ANION GAP SERPL CALC-SCNC: 14 MMOL/L — SIGNIFICANT CHANGE UP (ref 5–17)
APTT BLD: 51.1 SEC — HIGH (ref 27.5–35.5)
BUN SERPL-MCNC: 23 MG/DL — SIGNIFICANT CHANGE UP (ref 7–23)
CALCIUM SERPL-MCNC: 9.1 MG/DL — SIGNIFICANT CHANGE UP (ref 8.4–10.5)
CHLORIDE SERPL-SCNC: 105 MMOL/L — SIGNIFICANT CHANGE UP (ref 96–108)
CO2 SERPL-SCNC: 17 MMOL/L — LOW (ref 22–31)
CREAT SERPL-MCNC: 0.73 MG/DL — SIGNIFICANT CHANGE UP (ref 0.5–1.3)
GLUCOSE SERPL-MCNC: 107 MG/DL — HIGH (ref 70–99)
HCT VFR BLD CALC: 33.3 % — LOW (ref 34.5–45)
HGB BLD-MCNC: 11.4 G/DL — LOW (ref 11.5–15.5)
MCHC RBC-ENTMCNC: 32.5 PG — SIGNIFICANT CHANGE UP (ref 27–34)
MCHC RBC-ENTMCNC: 34.2 GM/DL — SIGNIFICANT CHANGE UP (ref 32–36)
MCV RBC AUTO: 94.9 FL — SIGNIFICANT CHANGE UP (ref 80–100)
NRBC # BLD: 0 /100 WBCS — SIGNIFICANT CHANGE UP (ref 0–0)
PLATELET # BLD AUTO: 212 K/UL — SIGNIFICANT CHANGE UP (ref 150–400)
POTASSIUM SERPL-MCNC: 4 MMOL/L — SIGNIFICANT CHANGE UP (ref 3.5–5.3)
POTASSIUM SERPL-SCNC: 4 MMOL/L — SIGNIFICANT CHANGE UP (ref 3.5–5.3)
RBC # BLD: 3.51 M/UL — LOW (ref 3.8–5.2)
RBC # FLD: 12.3 % — SIGNIFICANT CHANGE UP (ref 10.3–14.5)
SODIUM SERPL-SCNC: 136 MMOL/L — SIGNIFICANT CHANGE UP (ref 135–145)
WBC # BLD: 8.84 K/UL — SIGNIFICANT CHANGE UP (ref 3.8–10.5)
WBC # FLD AUTO: 8.84 K/UL — SIGNIFICANT CHANGE UP (ref 3.8–10.5)

## 2021-10-19 PROCEDURE — 99233 SBSQ HOSP IP/OBS HIGH 50: CPT | Mod: GC

## 2021-10-19 RX ORDER — METOPROLOL TARTRATE 50 MG
25 TABLET ORAL ONCE
Refills: 0 | Status: COMPLETED | OUTPATIENT
Start: 2021-10-19 | End: 2021-10-19

## 2021-10-19 RX ORDER — METOPROLOL TARTRATE 50 MG
50 TABLET ORAL DAILY
Refills: 0 | Status: DISCONTINUED | OUTPATIENT
Start: 2021-10-20 | End: 2021-10-20

## 2021-10-19 RX ORDER — APIXABAN 2.5 MG/1
5 TABLET, FILM COATED ORAL
Refills: 0 | Status: DISCONTINUED | OUTPATIENT
Start: 2021-10-19 | End: 2021-10-20

## 2021-10-19 RX ADMIN — Medication 25 MILLIGRAM(S): at 13:26

## 2021-10-19 RX ADMIN — SENNA PLUS 2 TABLET(S): 8.6 TABLET ORAL at 22:08

## 2021-10-19 RX ADMIN — HEPARIN SODIUM 850 UNIT(S)/HR: 5000 INJECTION INTRAVENOUS; SUBCUTANEOUS at 06:26

## 2021-10-19 RX ADMIN — APIXABAN 5 MILLIGRAM(S): 2.5 TABLET, FILM COATED ORAL at 13:27

## 2021-10-19 RX ADMIN — TICAGRELOR 90 MILLIGRAM(S): 90 TABLET ORAL at 05:47

## 2021-10-19 RX ADMIN — Medication 25 MILLIGRAM(S): at 05:48

## 2021-10-19 RX ADMIN — Medication 10 MILLIGRAM(S): at 05:47

## 2021-10-19 RX ADMIN — APIXABAN 5 MILLIGRAM(S): 2.5 TABLET, FILM COATED ORAL at 22:08

## 2021-10-19 RX ADMIN — AMLODIPINE BESYLATE 5 MILLIGRAM(S): 2.5 TABLET ORAL at 05:48

## 2021-10-19 NOTE — PROGRESS NOTE ADULT - SUBJECTIVE AND OBJECTIVE BOX
Patient is a 76y old  Female who presents with a chief complaint of     SUBJECTIVE / OVERNIGHT EVENTS: Comfortable without new complaints.   Review of Systems  chest pain no  palpitations no  sob no  nausea no  headache no    MEDICATIONS  (STANDING):  apixaban 5 milliGRAM(s) Oral two times a day  atorvastatin 40 milliGRAM(s) Oral at bedtime  enalapril 10 milliGRAM(s) Oral daily  polyethylene glycol 3350 17 Gram(s) Oral once  senna 2 Tablet(s) Oral at bedtime    MEDICATIONS  (PRN):      Vital Signs Last 24 Hrs  T(C): 36.7 (19 Oct 2021 11:30), Max: 36.9 (18 Oct 2021 20:13)  T(F): 98 (19 Oct 2021 11:30), Max: 98.5 (18 Oct 2021 20:13)  HR: 76 (19 Oct 2021 11:30) (74 - 80)  BP: 119/79 (19 Oct 2021 11:30) (94/69 - 120/84)  BP(mean): --  RR: 18 (19 Oct 2021 11:30) (18 - 18)  SpO2: 99% (19 Oct 2021 11:30) (96% - 99%)    PHYSICAL EXAM:  GENERAL: NAD, well-developed  HEAD:  Atraumatic, Normocephalic  EYES: EOMI, PERRLA, conjunctiva and sclera clear  NECK: Supple, No JVD  CHEST/LUNG: Clear to auscultation bilaterally; No wheeze  HEART: Regular rate and rhythm; No murmurs, rubs, or gallops  ABDOMEN: Soft, Nontender, Nondistended; Bowel sounds present  EXTREMITIES:  2+ Peripheral Pulses, No clubbing, cyanosis, or edema  PSYCH: AAOx3  NEUROLOGY: non-focal  SKIN: No rashes or lesions    LABS:                        11.4   8.84  )-----------( 212      ( 19 Oct 2021 05:32 )             33.3     10-19    136  |  105  |  23  ----------------------------<  107<H>  4.0   |  17<L>  |  0.73    Ca    9.1      19 Oct 2021 05:34  Mg     2.0     10-18    TPro  6.3  /  Alb  4.2  /  TBili  0.8  /  DBili  x   /  AST  23  /  ALT  18  /  AlkPhos  49  10-18    PTT - ( 19 Oct 2021 05:32 )  PTT:51.1 sec  CARDIAC MARKERS ( 18 Oct 2021 07:17 )  x     / x     / 75 U/L / x     / 4.1 ng/mL        < from: Cardiac Catheterization (10.18.21 @ 14:40) >  Diagnostic Findings:     Coronary Angiography   LM   Left main artery: The segment is visually normal in size and  structure.    LAD   Mid left anterior descending: There is a 30 % stenosis in the proximal  third portion of the segment. Mid left anterior descending:  There is a 30 % stenosis. Lesion length is 40 mm.      CX   Circumflex: Angiography shows mild atherosclerosis. First obtuse  marginal: The segment is visually normal in size and  structure. Second obtuse marginal: The segment is visually normal in  size and structure.    Patient: BRENNA LOYOLA               MRN: [id number]  Study Date: 10/18/2021   02:40 PM      Page 1 of 3          RCA  Right coronary artery: Angiography shows minor irregularities.      Left Heart Cath   The anterolateral and diaphragmatic wall segments are hypokinetic. The  apical segment is akinetic. Ejection fraction is 20%.    < end of copied text >      RADIOLOGY & ADDITIONAL TESTS:    Imaging Personally Reviewed:    Consultant(s) Notes Reviewed:      Care Discussed with Consultants/Other Providers:

## 2021-10-19 NOTE — PROGRESS NOTE ADULT - SUBJECTIVE AND OBJECTIVE BOX
Patient seen and examined at bedside.    Overnight Events: NAEON    Review Of Systems: No chest pain, shortness of breath, or palpitations            Current Meds:  amLODIPine   Tablet 5 milliGRAM(s) Oral daily  atorvastatin 40 milliGRAM(s) Oral at bedtime  enalapril 10 milliGRAM(s) Oral daily  heparin   Injectable 3200 Unit(s) IV Push once  heparin   Injectable 3200 Unit(s) IV Push every 6 hours PRN  heparin  Infusion. 750 Unit(s)/Hr IV Continuous <Continuous>  metoprolol succinate ER 25 milliGRAM(s) Oral daily  polyethylene glycol 3350 17 Gram(s) Oral once  senna 2 Tablet(s) Oral at bedtime  ticagrelor 90 milliGRAM(s) Oral every 12 hours      Vitals:  T(F): 97.6 (10-19), Max: 98.5 (10-18)  HR: 80 (10-19) (63 - 80)  BP: 120/84 (10-19) (84/68 - 124/87)  RR: 18 (10-19)  SpO2: 99% (10-19)  I&O's Summary    18 Oct 2021 07:01  -  19 Oct 2021 07:00  --------------------------------------------------------  IN: 600 mL / OUT: 0 mL / NET: 600 mL        Physical Exam:  Appearance: No acute distress; well appearing  Eyes: PERRL, EOMI, pink conjunctiva  HEENT: Normal oral mucosa  Cardiovascular: RRR, S1, S2, no murmurs, rubs, or gallops; no edema; no JVD  Respiratory: Clear to auscultation bilaterally  Gastrointestinal: soft, non-tender, non-distended with normal bowel sounds  Musculoskeletal: No clubbing; no joint deformity   Neurologic: Non-focal  Lymphatic: No lymphadenopathy  Psychiatry: AAOx3, mood & affect appropriate  Skin: No rashes, ecchymoses, or cyanosis                          11.4   8.84  )-----------( 212      ( 19 Oct 2021 05:32 )             33.3     10-19    136  |  105  |  23  ----------------------------<  107<H>  4.0   |  17<L>  |  0.73    Ca    9.1      19 Oct 2021 05:34  Mg     2.0     10-18    TPro  6.3  /  Alb  4.2  /  TBili  0.8  /  DBili  x   /  AST  23  /  ALT  18  /  AlkPhos  49  10-18    PTT - ( 19 Oct 2021 05:32 )  PTT:51.1 sec  CARDIAC MARKERS ( 18 Oct 2021 07:17 )  591 ng/L / x     / x     / 75 U/L / x     / 4.1 ng/mL  CARDIAC MARKERS ( 17 Oct 2021 06:52 )  436 ng/L / x     / x     / x     / x     / x      CARDIAC MARKERS ( 16 Oct 2021 21:05 )  439 ng/L / x     / x     / 148 U/L / x     / 10.2 ng/mL  CARDIAC MARKERS ( 16 Oct 2021 13:17 )  323 ng/L / x     / x     / x     / x     / x      CARDIAC MARKERS ( 16 Oct 2021 12:04 )  324 ng/L / x     / x     / x     / x     / x          Serum Pro-Brain Natriuretic Peptide: 7075 pg/mL (10-16 @ 16:21)          New ECG(s): Personally reviewed    < from: Transthoracic Echocardiogram (10.16.21 @ 13:35) >  1. Normal trileaflet aortic valve. No aortic valve  regurgitation seen.  2. Severe segmental left ventricular systolic dysfunction.  Thebasal segments are hyperdynamic with apical akinesis.  The mid anterior septum, the apical inferior wall, the  apical anterior wall, the apical septum, the apical lateral  wall, and the mid inferoseptum are akinetic.  3. Right ventricular enlargementwith normal right  ventricular systolic function.  4. Estimated right ventricular systolic pressure equals 39  mm Hg, assuming right atrial pressure equals 8 mm Hg,  consistent with borderline pulmonary hypertension.  Estimated right ventricular systolic pressure equals 41.36  - 46.36 mm Hg, assuming right atrial pressure equals 10 -  15 mm Hg, consistent with borderline pulmonary  hypertension.    < end of copied text >   Patient seen and examined at bedside.    Overnight Events: NATASHAON    Review Of Systems:              Current Meds:  amLODIPine   Tablet 5 milliGRAM(s) Oral daily  atorvastatin 40 milliGRAM(s) Oral at bedtime  enalapril 10 milliGRAM(s) Oral daily  heparin   Injectable 3200 Unit(s) IV Push once  heparin   Injectable 3200 Unit(s) IV Push every 6 hours PRN  heparin  Infusion. 750 Unit(s)/Hr IV Continuous <Continuous>  metoprolol succinate ER 25 milliGRAM(s) Oral daily  polyethylene glycol 3350 17 Gram(s) Oral once  senna 2 Tablet(s) Oral at bedtime  ticagrelor 90 milliGRAM(s) Oral every 12 hours      Vitals:  T(F): 97.6 (10-19), Max: 98.5 (10-18)  HR: 80 (10-19) (63 - 80)  BP: 120/84 (10-19) (84/68 - 124/87)  RR: 18 (10-19)  SpO2: 99% (10-19)  I&O's Summary    18 Oct 2021 07:01  -  19 Oct 2021 07:00  --------------------------------------------------------  IN: 600 mL / OUT: 0 mL / NET: 600 mL        Physical Exam:  Appearance: No acute distress; well appearing  Eyes: PERRL, EOMI, pink conjunctiva  HEENT: Normal oral mucosa  Cardiovascular: RRR, S1, S2, no murmurs, rubs, or gallops; no edema; no JVD  Respiratory: Clear to auscultation bilaterally  Gastrointestinal: soft, non-tender, non-distended with normal bowel sounds  Musculoskeletal: No clubbing; no joint deformity   Neurologic: Non-focal  Lymphatic: No lymphadenopathy  Psychiatry: AAOx3, mood & affect appropriate  Skin: No rashes, ecchymoses, or cyanosis                          11.4   8.84  )-----------( 212      ( 19 Oct 2021 05:32 )             33.3     10-19    136  |  105  |  23  ----------------------------<  107<H>  4.0   |  17<L>  |  0.73    Ca    9.1      19 Oct 2021 05:34  Mg     2.0     10-18    TPro  6.3  /  Alb  4.2  /  TBili  0.8  /  DBili  x   /  AST  23  /  ALT  18  /  AlkPhos  49  10-18    PTT - ( 19 Oct 2021 05:32 )  PTT:51.1 sec  CARDIAC MARKERS ( 18 Oct 2021 07:17 )  591 ng/L / x     / x     / 75 U/L / x     / 4.1 ng/mL  CARDIAC MARKERS ( 17 Oct 2021 06:52 )  436 ng/L / x     / x     / x     / x     / x      CARDIAC MARKERS ( 16 Oct 2021 21:05 )  439 ng/L / x     / x     / 148 U/L / x     / 10.2 ng/mL  CARDIAC MARKERS ( 16 Oct 2021 13:17 )  323 ng/L / x     / x     / x     / x     / x      CARDIAC MARKERS ( 16 Oct 2021 12:04 )  324 ng/L / x     / x     / x     / x     / x          Serum Pro-Brain Natriuretic Peptide: 7075 pg/mL (10-16 @ 16:21)          New ECG(s): Personally reviewed    < from: Transthoracic Echocardiogram (10.16.21 @ 13:35) >  1. Normal trileaflet aortic valve. No aortic valve  regurgitation seen.  2. Severe segmental left ventricular systolic dysfunction.  Thebasal segments are hyperdynamic with apical akinesis.  The mid anterior septum, the apical inferior wall, the  apical anterior wall, the apical septum, the apical lateral  wall, and the mid inferoseptum are akinetic.  3. Right ventricular enlargementwith normal right  ventricular systolic function.  4. Estimated right ventricular systolic pressure equals 39  mm Hg, assuming right atrial pressure equals 8 mm Hg,  consistent with borderline pulmonary hypertension.  Estimated right ventricular systolic pressure equals 41.36  - 46.36 mm Hg, assuming right atrial pressure equals 10 -  15 mm Hg, consistent with borderline pulmonary  hypertension.    < end of copied text >

## 2021-10-19 NOTE — PROGRESS NOTE ADULT - ATTENDING COMMENTS
76 year old woman with atypical symptoms, now without chest pain, denies dyspnea. ECG, troponin and concerning for ACS, but coronary angiogram now completed (yesterday) and coronaries normal, classical apical ballooning angiogram for stress cardiomyopathy. Need to optimize regimen, increase beta blocker, discontinue amlodipine. Continue lisinopril. Heparin infusion should be discontinued. She wants to go home as soon as possible, but encouraged her to allow optimization of medications, hope for discharge tomorrow. She can follow up as outpatient with me about 10 days to 2 weeks after discharge.    To contact call Cardiology Fellow or Attending as listed on [email or web address] password: [email or web address]. 76 year old woman with atypical symptoms, now without chest pain, denies dyspnea. ECG, troponin and concerning for ACS, but coronary angiogram now completed (yesterday) and coronaries normal, classical apical ballooning angiogram for stress cardiomyopathy. Need to optimize regimen, increase beta blocker, discontinue amlodipine. Continue lisinopril. Heparin infusion should be discontinued, but observing severity of apical ballooning favor apixaban 5 mg BID until LV function improves. She wants to go home as soon as possible, but encouraged her to allow optimization of medications, hope for discharge tomorrow. She can follow up as outpatient with me about 10 days to 2 weeks after discharge.    To contact call Cardiology Fellow or Attending as listed on [email or web address] password: [email or web address].

## 2021-10-19 NOTE — PROGRESS NOTE ADULT - ASSESSMENT
76 f with    MI/ Chest pain  - telemetry  - BB  - ASA  - Brillinta  - AC with Heparin    - Cardiology follow   - s/p Cath       HTN control    DVT prophylaxis    Ruben Suarez MD pager [phone]  76 f with    MI/ Cardiomyopathy   - telemetry  - Toprol 50   - ASA  - AC with Heparin  / bridge to Eliquis  - Cardiology follow   - s/p Cath with nl coronaries      HTN control    DVT prophylaxis    DCP home in am if stable.    Ruben Suarez MD pager [phone]

## 2021-10-19 NOTE — PROGRESS NOTE ADULT - ASSESSMENT
77 yo F with hx of HTN was sent in from PCP with recent stress and new EKG changes with biphasic TWI in the precordial leads. TTE with apical akinesis and hyperdynamic base.     -cardiac enzymes cont to trend up, despite cp, given the risk factors and EKG changes, plan for LHC today with Dr. Hawkins (added on). `  -cont asa, brilinta and heparin gtt   -cont toprol XL 25 mg QD   -cont ACE-I  -DC amlodipine to allow for more room of uptitration of HF GDMT  77 yo F with hx of HTN was sent in from PCP with recent stress and new EKG changes with biphasic TWI in the precordial leads. TTE with apical akinesis and hyperdynamic base.     -cardiac enzymes cont to trend up, despite cp, given the risk factors and EKG changes, plan for LHC today with Dr. Hawkins (added on). `  -DC asa, brilinta and heparin gtt   -increase toprol to 50 daily  -cont ACE-I  -DC amlodipine to allow for more room of uptitration of HF GDMT  77 yo F with hx of HTN was sent in from PCP with recent stress and new EKG changes with biphasic TWI in the precordial leads. TTE with apical akinesis and hyperdynamic base.     -LHC with normal coronaries  -DC asa, brilinta and heparin gtt   -increase toprol to 50 daily  -cont ACE-I  -DC amlodipine to allow for more room of uptitration of HF GDMT

## 2021-10-20 ENCOUNTER — TRANSCRIPTION ENCOUNTER (OUTPATIENT)
Age: 76
End: 2021-10-20

## 2021-10-20 VITALS
TEMPERATURE: 98 F | RESPIRATION RATE: 17 BRPM | HEART RATE: 68 BPM | DIASTOLIC BLOOD PRESSURE: 83 MMHG | SYSTOLIC BLOOD PRESSURE: 123 MMHG | OXYGEN SATURATION: 98 %

## 2021-10-20 LAB
HCT VFR BLD CALC: 32.3 % — LOW (ref 34.5–45)
HGB BLD-MCNC: 11 G/DL — LOW (ref 11.5–15.5)
MCHC RBC-ENTMCNC: 32.2 PG — SIGNIFICANT CHANGE UP (ref 27–34)
MCHC RBC-ENTMCNC: 34.1 GM/DL — SIGNIFICANT CHANGE UP (ref 32–36)
MCV RBC AUTO: 94.4 FL — SIGNIFICANT CHANGE UP (ref 80–100)
NRBC # BLD: 0 /100 WBCS — SIGNIFICANT CHANGE UP (ref 0–0)
PLATELET # BLD AUTO: 202 K/UL — SIGNIFICANT CHANGE UP (ref 150–400)
RBC # BLD: 3.42 M/UL — LOW (ref 3.8–5.2)
RBC # FLD: 12.5 % — SIGNIFICANT CHANGE UP (ref 10.3–14.5)
WBC # BLD: 7.04 K/UL — SIGNIFICANT CHANGE UP (ref 3.8–10.5)
WBC # FLD AUTO: 7.04 K/UL — SIGNIFICANT CHANGE UP (ref 3.8–10.5)

## 2021-10-20 PROCEDURE — 99233 SBSQ HOSP IP/OBS HIGH 50: CPT | Mod: GC

## 2021-10-20 RX ORDER — METOPROLOL TARTRATE 50 MG
1 TABLET ORAL
Qty: 30 | Refills: 0
Start: 2021-10-20 | End: 2021-11-18

## 2021-10-20 RX ORDER — APIXABAN 2.5 MG/1
1 TABLET, FILM COATED ORAL
Qty: 60 | Refills: 0
Start: 2021-10-20 | End: 2021-11-18

## 2021-10-20 RX ORDER — ATORVASTATIN CALCIUM 80 MG/1
1 TABLET, FILM COATED ORAL
Qty: 30 | Refills: 0
Start: 2021-10-20 | End: 2021-11-18

## 2021-10-20 RX ADMIN — Medication 10 MILLIGRAM(S): at 06:02

## 2021-10-20 RX ADMIN — APIXABAN 5 MILLIGRAM(S): 2.5 TABLET, FILM COATED ORAL at 06:02

## 2021-10-20 RX ADMIN — Medication 50 MILLIGRAM(S): at 06:02

## 2021-10-20 NOTE — DISCHARGE NOTE NURSING/CASE MANAGEMENT/SOCIAL WORK - PATIENT PORTAL LINK FT
You can access the FollowMyHealth Patient Portal offered by [facility] by registering at the following website: [email or web address]. By joining [facility]’s FollowMyHealth portal, you will also be able to view your health information using other applications (apps) compatible with our system.

## 2021-10-20 NOTE — DISCHARGE NOTE PROVIDER - NSDCMRMEDTOKEN_GEN_ALL_CORE_FT
amLODIPine 5 mg oral tablet: 1 tab(s) orally once a day  enalapril 10 mg oral tablet: 1 tab(s) orally once a day  Vitamin C:   Vitamin D3:    apixaban 5 mg oral tablet: 1 tab(s) orally 2 times a day  atorvastatin 40 mg oral tablet: 1 tab(s) orally once a day (at bedtime)  enalapril 10 mg oral tablet: 1 tab(s) orally once a day  metoprolol succinate 50 mg oral tablet, extended release: 1 tab(s) orally once a day  Vitamin C:   Vitamin D3:    apixaban 5 mg oral tablet: 1 tab(s) orally 2 times a day  atorvastatin 40 mg oral tablet: 1 tab(s) orally once a day (at bedtime)  enalapril 10 mg oral tablet: 1 tab(s) orally once a day  metoprolol succinate 50 mg oral tablet, extended release: 1 tab(s) orally once a day

## 2021-10-20 NOTE — DISCHARGE NOTE PROVIDER - CARE PROVIDER_API CALL
Zohaib St (MD)  Cardiovascular Disease; Internal Medicine; Nuclear Cardiology  [address]  [place] [address]  Phone: [phone]  Fax: [phone]  Follow Up Time: 1 week   Zohaib St (MD)  Cardiovascular Disease; Internal Medicine; Nuclear Cardiology  [address]  [place] [address]  Phone: [phone]  Fax: [phone]  Follow Up Time: 1 week    BABAR GALO [place] [id number]  Phone: [phone]  Fax: [phone]  Follow Up Time: 1 week

## 2021-10-20 NOTE — PROGRESS NOTE ADULT - ASSESSMENT
77 yo F with hx of HTN was sent in from PCP with recent stress and new EKG changes with biphasic TWI in the precordial leads. TTE with apical akinesis and hyperdynamic base consistent with likely stress-induced cardiomyopathy     Stress induced cardiomyopathy   This clinical picture is not consistent with ACS   LHC with normal coronaries, V-gram with apical ballooning   Cont Eliquis 5 mg BID for ppx against LV thrombus. Plan to repeat ECHO as outpatient to assess LV recovery   Cont Toprol 50 mg QD, Lipitor 40 mg QD and Enalapril 10 mg QD   Discharge planning home today with plans to follow up with Cardiology (Dr. St) upon discharge     Solis Arango MD  Cardiology Fellow  [phone]    Please check [email or web address] password: [email or web address]" for cardiology service schedule and contact information.

## 2021-10-20 NOTE — PHARMACOTHERAPY INTERVENTION NOTE - COMMENTS
75 yo F with hx of HTN being anticoagulated on Eliquis for stress-induced cardiomyopathy.     Counseled patient on the following discharge medications names (brand/generic), indication, and possible side effects:  apixaban 5 milliGRAM(s) Oral two times a day  atorvastatin 40 milliGRAM(s) Oral at bedtime  enalapril 10 milliGRAM(s) Oral daily  metoprolol succinate ER 50 milliGRAM(s) Oral daily    Patient was provided with a medication card for their new medication describing brand/generic name, indication, and possible side effects. Thoroughly counseled on apixaban since that is a new medication for the patient. Patient questions and concerns were answered and addressed. Patient demonstrated understanding. Patient understood importance of compliance and to follow up with cardiologist once discharged.    Sherri Donovan, PharmD, [facility]  Clinical Pharmacy Specialist  [phone] or Teams

## 2021-10-20 NOTE — DISCHARGE NOTE PROVIDER - PROVIDER TOKENS
PROVIDER:[TOKEN:[[id number]],FOLLOWUP:[1 week]] PROVIDER:[TOKEN:[[id number]],FOLLOWUP:[1 week]],PROVIDER:[TOKEN:[[id number]],FOLLOWUP:[1 week]]

## 2021-10-20 NOTE — DISCHARGE NOTE PROVIDER - NSDCCPTREATMENT_GEN_ALL_CORE_FT
PRINCIPAL PROCEDURE  Procedure: Left heart cardiac cath  Findings and Treatment: No heavy lifting or pushing/pulling with procedure arm for 2 weeks. No driving for 2 days. You may shower 24 hours following the procedure but avoid baths/swimming for 1 week. Check your wrist site for bleeding and/or swelling daily following procedure and call your doctor immediately if it occurs or if you experience increased pain at the site, cold or discoloration of arm. Follow up with your cardiologist in 1-2 weeks. You may call [facility] if you have any questions/concerns regarding your procedure [phone].

## 2021-10-20 NOTE — CHART NOTE - NSCHARTNOTEFT_GEN_A_CORE
Patient had first dose of COVID Vaccine and does not want to take the second dose because she does not feel good about it.  Explained risks vs benefit.     03993
S/P LHC on 10/18/21 with normal coronaries  Per cards - Dr. St discontinued Brilinta and heparin gtt, increased toprol to 50 daily and discontinued amlodipine to allow for more room of uptitration of HF GDMT   and also to start Eliquis due Apical ballooning stress CM.  Dr. Suarez concurs    [id number]
Discussed with cardiology regarding episodes of PATs - recent rate of 143 for 4.6secs  Pt ok to be discharged and continue toprol 50mg daily    [id number]
[facility] Medicine Event Note  Confirmed with Dr. Staples that cardiology team reviewed TTE done today  Plan for cardiac cath tomorrow    Sarah Caballero PA-C  Dept of Medicine

## 2021-10-20 NOTE — PROGRESS NOTE ADULT - PROVIDER SPECIALTY LIST ADULT
Cardiology
Internal Medicine
Cardiology
Internal Medicine
Cardiology

## 2021-10-20 NOTE — DISCHARGE NOTE PROVIDER - NSDCCPCAREPLAN_GEN_ALL_CORE_FT
PRINCIPAL DISCHARGE DIAGNOSIS  Diagnosis: Nonischemic cardiomyopathy  Assessment and Plan of Treatment: You were sent in from PCP with recent stress and new EKG changes   You had a cardiac catheterization with normal coronaries with  ballooning of the heart wall  You were admitted with Type 2 Myocardial Infarction secondary to Stress induced cardiomyopathy.  You did not blockage in your heart arteries  You will need to managed with medication/  Continue Toprol 50 mg QD, Lipitor 40 mg QD and Enalapril 10 mg QD   You were started on blood thinner called Eliquis as prevention for clots due to ballooning of heart wall  You will need close follow up with Cardiologist - Follow up with Dr. St  You need repeat ECHO as outpatient to assess improvement of heart function  Do not eat or drink foods containing more than 2000mg of salt (sodium) in your diet every day.  Call your Health Care Provider if you have any swelling or increased swelling in your feet, ankles, and/or stomach.  Take all of your medication as directed.  If you become dizzy call your Health Care Provider.         PRINCIPAL DISCHARGE DIAGNOSIS  Diagnosis: Nonischemic cardiomyopathy  Assessment and Plan of Treatment: You were sent in from PCP with recent stress and new EKG changes   You had a cardiac catheterization with normal heart arteries with  ballooning of the heart wall  You were admitted with Type 2 Myocardial Infarction secondary to Stress induced cardiomyopathy.  You did not blockage in your heart arteries  You will need to be managed with medications, rest and relaxation  Continue Toprol 50 mg QD, Lipitor 40 mg QD and Enalapril 10 mg QD   You were started on blood thinner called Eliquis as prevention for clots due to ballooning of heart wall  You will need close follow up with Cardiologist - Follow up with Dr. St  You need repeat ECHO as outpatient to assess improvement of heart function  Do not eat or drink foods containing more than 2000mg of salt (sodium) in your diet every day.  Call your Health Care Provider if you have any swelling or increased swelling in your feet, ankles, and/or stomach.  Take all of your medication as directed.  If you become dizzy call your Health Care Provider.        SECONDARY DISCHARGE DIAGNOSES  Diagnosis: Hypertension  Assessment and Plan of Treatment: Take medications for your blood pressure as recommended.  Amlodipine disconitnued  Eat a heart healthy diet that is low in saturated fats and salt, and includes whole grains, fruits, vegetables and lean protein   Continue to follow with your primary physician or cardiologist.   Seek medical help for dizziness, Lightheadedness, Blurry vision, Headache, Chest pain, Shortness of breath  Follow up with your medical doctor to establish long term blood pressure treatment goals.

## 2021-10-20 NOTE — PROGRESS NOTE ADULT - SUBJECTIVE AND OBJECTIVE BOX
Patient is a 76y old  Female who presents with a chief complaint of     SUBJECTIVE / OVERNIGHT EVENTS: Comfortable without new complaints. Wants to go home.  Review of Systems  chest pain no  palpitations no  sob no  nausea no  headache no    MEDICATIONS  (STANDING):  apixaban 5 milliGRAM(s) Oral two times a day  atorvastatin 40 milliGRAM(s) Oral at bedtime  enalapril 10 milliGRAM(s) Oral daily  metoprolol succinate ER 50 milliGRAM(s) Oral daily  polyethylene glycol 3350 17 Gram(s) Oral once  senna 2 Tablet(s) Oral at bedtime    MEDICATIONS  (PRN):      Vital Signs Last 24 Hrs  T(C): 36.4 (20 Oct 2021 11:33), Max: 36.6 (19 Oct 2021 20:31)  T(F): 97.5 (20 Oct 2021 11:33), Max: 97.9 (19 Oct 2021 20:31)  HR: 68 (20 Oct 2021 11:33) (62 - 74)  BP: 123/83 (20 Oct 2021 11:33) (101/68 - 123/83)  BP(mean): --  RR: 17 (20 Oct 2021 11:33) (17 - 18)  SpO2: 98% (20 Oct 2021 11:33) (98% - 99%)    PHYSICAL EXAM:  GENERAL: NAD  HEAD:  Atraumatic, Normocephalic  EYES: EOMI, PERRLA, conjunctiva and sclera clear  NECK: Supple, No JVD  CHEST/LUNG: Clear to auscultation bilaterally; No wheeze  HEART: Regular rate and rhythm; No murmurs, rubs, or gallops  ABDOMEN: Soft, Nontender, Nondistended; Bowel sounds present  EXTREMITIES:  2+ Peripheral Pulses, No clubbing, cyanosis, or edema  PSYCH: AAOx3  NEUROLOGY: non-focal  SKIN: No rashes or lesions    LABS:                        11.0   7.04  )-----------( 202      ( 20 Oct 2021 06:10 )             32.3     10-19    136  |  105  |  23  ----------------------------<  107<H>  4.0   |  17<L>  |  0.73    Ca    9.1      19 Oct 2021 05:34      PTT - ( 19 Oct 2021 05:32 )  PTT:51.1 sec            RADIOLOGY & ADDITIONAL TESTS:    Imaging Personally Reviewed:    Consultant(s) Notes Reviewed:      Care Discussed with Consultants/Other Providers:

## 2021-10-20 NOTE — PROGRESS NOTE ADULT - ATTENDING COMMENTS
76 year old woman with atypical symptoms, now without chest pain, denies dyspnea. ECG, troponin and concerning for ACS, but coronary angiogram  with coronaries normal, classical apical ballooning angiogram for stress cardiomyopathy. Optimize regimen, with increased beta blocker, discontinued amlodipine. Continue lisinopril. Heparin infusion discontinued, but observing severity of apical ballooning started apixaban 5 mg BID until LV function improves. Ready for discharge to home today. She can follow up as outpatient with me in about 10 days to 2 weeks after discharge.    To contact call Cardiology Fellow or Attending as listed on [email or web address] password: [email or web address].

## 2021-10-20 NOTE — DISCHARGE NOTE PROVIDER - CARE PROVIDERS DIRECT ADDRESSES
,[email or web address]

## 2021-10-20 NOTE — PROVIDER CONTACT NOTE (OTHER) - SITUATION
Pt s/p cath with no heparin gtt running, heparin gtt active on EMAR
Tele event:  x 3.6 sec
pt refusing Lipitor
PT had pt had PAT x 5.4secs

## 2021-10-20 NOTE — PROGRESS NOTE ADULT - ASSESSMENT
76 f with    Stress induced Cardiomyopathy not ACS  - telemetry  - Toprol 50   - ASA  - AC with Eliquis  - Cardiology follow   - s/p Cath with nl coronaries      HTN control    DVT prophylaxis    DCP home in am if stable.    Ruben Suarez MD pager [phone]  76 f with    Stress induced Cardiomyopathy not ACS  - telemetry  - Toprol 50   - ASA  - AC with Eliquis  - Cardiology follow   - s/p Cath with nl coronaries      HTN control    DVT prophylaxis    DC home. Follow with PMD/ Cardiology in 3-4 days. QA    Ruben Suarez MD pager [phone]

## 2021-10-20 NOTE — PROGRESS NOTE ADULT - SUBJECTIVE AND OBJECTIVE BOX
PATIENT:  BRENNA LOYOLA  [id number]    CHIEF COMPLAINT:  Patient is a 76y old  Female who presents with a chief complaint of          SUBJECTIVE: Patient seen and examined at bedside. Vital signs stable overnight. Patient denies headache, dizziness, chest/abd pain, worsening shortness of breath. ROS negative unless otherwise stated.     REVIEW OF SYSTEMS:         General: No fevers, No chills, No malaise        HEENT: No headaches, No changes in vision, No dysphagia        CVS: No chest pain, No palpitations        Pulm: No SOB, No wheezing        GI: No abdominal pain, No nausea, No vomiting, No changes in bowel         : No dysuria         Ext: No edema, No claudications,    MEDICATIONS  (STANDING):  apixaban 5 milliGRAM(s) Oral two times a day  atorvastatin 40 milliGRAM(s) Oral at bedtime  enalapril 10 milliGRAM(s) Oral daily  metoprolol succinate ER 50 milliGRAM(s) Oral daily  polyethylene glycol 3350 17 Gram(s) Oral once  senna 2 Tablet(s) Oral at bedtime        OBJECTIVE:  ICU Vital Signs Last 24 Hrs  T(C): 36.5 (20 Oct 2021 04:21), Max: 36.7 (19 Oct 2021 11:30)  T(F): 97.7 (20 Oct 2021 04:21), Max: 98 (19 Oct 2021 11:30)  HR: 66 (20 Oct 2021 06:00) (62 - 80)  BP: 104/74 (20 Oct 2021 06:00) (101/68 - 119/79)   RR: 18 (20 Oct 2021 04:21) (18 - 18)  SpO2: 98% (20 Oct 2021 04:21) (98% - 99%)          I&O's Summary    19 Oct 2021 07:01  -  20 Oct 2021 07:00  --------------------------------------------------------  IN: 420 mL / OUT: 0 mL / NET: 420 mL    20 Oct 2021 07:01  -  20 Oct 2021 10:51  --------------------------------------------------------  IN: 480 mL / OUT: 0 mL / NET: 480 mL      Daily     Daily Weight in k.2 (20 Oct 2021 07:54)    Physical Exam:  Appearance: No acute distress; well appearing  Eyes: PERRL, EOMI, pink conjunctiva  HEENT: Normal oral mucosa  Cardiovascular: RRR, S1, S2, no murmurs, rubs, or gallops; no edema; no JVD  Respiratory: Clear to auscultation bilaterally  Gastrointestinal: soft, non-tender, non-distended with normal bowel sounds  Musculoskeletal: No clubbing; no joint deformity   Neurologic: Non-focal  Lymphatic: No lymphadenopathy  Psychiatry: AAOx3, mood & affect appropriate  Skin: No rashes, ecchymoses, or cyanosis      TELEMETRY: SR 5- 70      IMAGING:  < from: Transthoracic Echocardiogram (10.16.21 @ 13:35) >  Dimensions:    Normal Values:  LA:     2.8    2.0 - 4.0 cm  Ao:     2.6    2.0 - 3.8 cm  SEPTUM: 1.1    0.6 - 1.2 cm  PWT:    1.0    0.6 - 1.1 cm  LVIDd:  4.3    3.0 - 5.6 cm  LVIDs:  2.7    1.8 - 4.0 cm  Derived variables:  LVMI: 103 g/m2  RWT: 0.46  Fractional short: 37 %  EF (Visual Estimate): 25-30 %  Doppler Peak Velocity (m/sec): AoV=1.5  ------------------------------------------------------------------------  Observations:  Mitral Valve: Mitral annular calcification.  Thickened  mitral valve.  Aortic Valve/Aorta: Normal trileaflet aortic valve. Peak  transaortic valve gradient equals 9 mm Hg, mean transaortic  valve gradient equals 6 mm Hg, aortic valve velocity time  integral equals 27 cm. No aortic valve regurgitation seen.  Peak left ventricular outflow tract gradient equals 9 mm  Hg, mean gradient is equal to 6 mm Hg, LVOT velocity time  integral equals 27 cm.  Aortic Root: 2.6 cm.  Left Atrium: Normal left atrium.  Left Ventricle: Severe segmental left ventricular systolic  dysfunction. The basal segments are hyperdynamic with  apical akinesis.  The mid anterior septum, the apical  inferior wall, the apical anterior wall,the apical septum,  the apical lateral wall, and the mid inferoseptum are  akinetic.  Normal left ventricular internal dimensions and  wall thicknesses.  Right Heart: Normal right atrium. Right ventricular  enlargement with normal right ventricular systolic  function. Normal tricuspid valve. Mild tricuspid  regurgitation. Normal pulmonic valve. No pulmonic  regurgitation.  Pericardium/Pleura: Thickened pericardium.  Hemodynamic: Estimated right ventricular systolic pressure  equals 39 mm Hg, assuming right atrial pressure equals 8 mm  Hg, consistent with borderline pulmonary hypertension.  Estimated right ventricular systolic pressure equals 41.36  - 46.36 mm Hg, assuming right atrial pressure equals 10 -  15 mm Hg, consistent with borderlinepulmonary  hypertension.  ------------------------------------------------------------------------  Conclusions:  1. Normal trileaflet aortic valve. No aortic valve  regurgitation seen.  2. Severe segmental left ventricular systolic dysfunction.  Thebasal segments are hyperdynamic with apical akinesis.  The mid anterior septum, the apical inferior wall, the  apical anterior wall, the apical septum, the apical lateral  wall, and the mid inferoseptum are akinetic.  3. Right ventricular enlargementwith normal right  ventricular systolic function.  4. Estimated right ventricular systolic pressure equals 39  mm Hg, assuming right atrial pressure equals 8 mm Hg,  consistent with borderline pulmonary hypertension.  Estimated right ventricular systolic pressure equals 41.36  - 46.36 mm Hg, assuming right atrial pressure equals 10 -  15 mm Hg, consistent with borderline pulmonary  hypertension.  *** No previous Echo exam.  ------------------------------------------------------------------------  Confirmed on  10/16/2021 - 14:41:38 by Toni Frey M.D.  ------------------------------------------------------------------------    < end of copied text >      LABS:                          11.0   7.04  )-----------(       ( 20 Oct 2021 06:10 )             32.3     10-19    136  |  105  |  23  ----------------------------<  107<H>  4.0   |  17<L>  |  0.73    Ca    9.1      19 Oct 2021 05:34        PTT - ( 19 Oct 2021 05:32 )  PTT:51.1 sec

## 2021-10-20 NOTE — DISCHARGE NOTE PROVIDER - HOSPITAL COURSE
75 yo F with hx of HTN was sent in from PCP with recent stress and new EKG changes with biphasic TWI in the precordial leads with Troponinemia  TTE with apical akinesis and hyperdynamic base consistent with likely stress-induced cardiomyopathy  LHC with normal coronaries, V-gram with apical ballooning  Patient admitted with Type 2 MI secondary to Stress induced cardiomyopathy.  Cont Toprol 50 mg QD, Lipitor 40 mg QD and Enalapril 10 mg QD   This clinical picture is not consistent with ACS.  Cont Eliquis 5 mg BID for ppx against LV thrombus in the setting of apical ballooning.  Plan to repeat ECHO as outpatient to assess LV recovery     Discharge home with plans to follow up with Cardiology (Dr. St) upon discharge 77 yo F with hx of HTN was sent in from PCP with recent stress and new EKG changes with biphasic TWI in the precordial leads with Troponinemia  TTE with apical akinesis and hyperdynamic base consistent with likely stress-induced cardiomyopathy  LHC with normal coronaries, V-gram with apical ballooning.  Patient diagnosed with Type 2 MI secondary to Stress induced cardiomyopathy.  Cont Toprol 50 mg QD, Lipitor 40 mg QD and Enalapril 10 mg QD   This clinical picture is not consistent with ACS.  Cont Eliquis 5 mg BID for ppx against LV thrombus in the setting of apical ballooning.  Plan to repeat ECHO as outpatient to assess LV recovery     Pt tolerated OOB and ambulated in hallway without any symptoms. Medically cleared for discharge home by cardiology and Dr. Suarez and follow up with Cardiology (Dr. St) upon discharge

## 2021-10-25 ENCOUNTER — NON-APPOINTMENT (OUTPATIENT)
Age: 76
End: 2021-10-25

## 2021-10-26 ENCOUNTER — APPOINTMENT (OUTPATIENT)
Dept: UROGYNECOLOGY | Facility: CLINIC | Age: 76
End: 2021-10-26
Payer: MEDICARE

## 2021-10-26 PROCEDURE — 99213 OFFICE O/P EST LOW 20 MIN: CPT

## 2021-10-27 NOTE — PROCEDURE
[Good Fit] : fits well [Pessary Inserted] : inserted [Pessary Washed] : washed [H2O] : H2O [None] : no bleeding [Medication Review] : Medicaiton Review: Patient verbalizes understanding of risks and benefits [Erythema] : no erythema [Discharge] : no vaginal discharge [Infection] : no evidence of infection [FreeTextEntry1] : Luis A GIBBS # 2 3/4. [FreeTextEntry3] : Replens [FreeTextEntry8] : Reinforced daily pericare.

## 2021-10-27 NOTE — PHYSICAL EXAM
[No Acute Distress] : in no acute distress [Well developed] : well developed [Well Nourished] : ~L well nourished [Good Hygeine] : demonstrates good hygeine [Oriented x3] : oriented to person, place, and time [Normal Memory] : ~T memory was ~L unimpaired [Normal Mood/Affect] : mood and affect are normal [Soft, Nontender] : the abdomen was soft and nontender [Warm and Dry] : was warm and dry to touch [Normal Gait] : gait was normal [Vulvar Atrophy] : vulvar atrophy [Labia Majora] : were normal [Normal] : was normal [Dry Mucosa] : dry mucosa [Cystocele] : a cystocele [No Bleeding] : there was no active vaginal bleeding [de-identified] : Mild friable introitus with removal of pessary.  no bleeding noted.

## 2021-10-27 NOTE — DISCUSSION/SUMMARY
[FreeTextEntry1] : Pelvic prolapse:\par -Continue with Gellhorn LS # 2 3/4.\par -Precaution and instructions were reviewed.\par \par Vaginal atrophy:\par -Continue use of Replens.\par \par Follow up in 3 months or sooner for pelvic prolapse.  IF pt have any problems/concern to call office.  PT aware and agrees.

## 2021-11-04 ENCOUNTER — APPOINTMENT (OUTPATIENT)
Dept: CARDIOLOGY | Facility: CLINIC | Age: 76
End: 2021-11-04
Payer: MEDICARE

## 2021-11-04 ENCOUNTER — NON-APPOINTMENT (OUTPATIENT)
Age: 76
End: 2021-11-04

## 2021-11-04 VITALS
SYSTOLIC BLOOD PRESSURE: 160 MMHG | OXYGEN SATURATION: 100 % | HEART RATE: 63 BPM | DIASTOLIC BLOOD PRESSURE: 100 MMHG | WEIGHT: 112 LBS | BODY MASS INDEX: 21.87 KG/M2

## 2021-11-04 PROCEDURE — 93000 ELECTROCARDIOGRAM COMPLETE: CPT

## 2021-11-04 PROCEDURE — 99215 OFFICE O/P EST HI 40 MIN: CPT

## 2021-11-04 NOTE — DISCUSSION/SUMMARY
[Coronary Artery Disease] : coronary artery disease [Essential Hypertension] : essential hypertension [Deteriorating] : deteriorating [Medication Changes Per Orders] : Medication changes are as documented in orders [Patient] : the patient [de-identified] : Takotsubo cardiomyopathy [de-identified] : Non-obstructive plaque, but definite atherosclerosis [de-identified] : explained why she must be on atorvastatin which she did not take after hospital discharge [de-identified] : stopped amloodipine in hospital when blood pressure was low to allow use of ACE-i and beta blocker crucial in management of stress cardiomyopathy, but now blood pressure elevated and resume amlodipine

## 2021-11-04 NOTE — HISTORY OF PRESENT ILLNESS
[FreeTextEntry1] : Mrs. Sarah Stubbs is a 76 year old woman with atypical symptoms, now without chest pain, denies dyspnea. 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 Optimize regimen, with increased beta blocker, discontinued amlodipine. Continue lisinopril. Heparin infusion discontinued, but observing severity of apical ballooning started apixaban 5 mg BID until LV function improves. Ready for discharge to home today. She can follow up as outpatient with me in about 10 days to 2 weeks after discharge.\par \par Since discharge feeling well, fully active, no chest pain, no dyspnea, no limitation of any activities.

## 2021-11-04 NOTE — PHYSICAL EXAM
[Shifted Left] : shifted to the left and inferior [Normal Rate] : normal [Rhythm Regular] : regular [Normal S1] : normal S1 [Normal S2] : normal S2 [No Murmur] : no murmurs heard [No Pitting Edema] : no pitting edema present [Rt] : varicose veins of the right leg noted [Lt] : varicose veins of the left leg noted [2+] : left 2+ [1+] : left 1+ [No Abnormalities] : the abdominal aorta was not enlarged and no bruit was heard [Palpated Width ___ (cm)] : palpated width of [unfilled]Ucm [Normal] : alert and oriented, normal memory [Right Carotid Bruit] : no bruit heard over the right carotid [Left Carotid Bruit] : no bruit heard over the left carotid [Bruit] : no bruit heard

## 2021-11-04 NOTE — CARDIOLOGY SUMMARY
[de-identified] : 11/4/2021 sinus rhythm, left axis, deep, broad T-wave inversion diffusely consistent with known Takotsubo cardiomyopathy. [de-identified] : 10/16/2021 EF (Visual Estimate): 25-30 %, Mitral annular calcification.  Thickened mitral valve. Normal trileaflet aortic valve. Peak transaortic valve gradient equals 9 mm Hg, mean transaortic valve gradient equals 6 mm Hg, aortic valve velocity time integral equals 27 cm. No aortic valve regurgitation seen. Peak left ventricular outflow tract gradient equals 9 mm Hg, mean gradient is equal to 6 mm Hg, LVOT velocity time integral equals 27 cm. Aortic Root: 2.6 cm. Left Atrium: Normal left atrium. Left Ventricle: Severe segmental left ventricular systolic dysfunction. The basal segments are hyperdynamic with apical akinesis.  The mid anterior septum, the apical inferior wall, the apical anterior wall, the apical septum, the apical lateral wall, and the mid inferoseptum are akinetic.  Normal left ventricular internal dimensions and wall thicknesses. Right Heart: Normal right atrium. Right ventricular enlargement with normal right ventricular systolic function. Normal tricuspid valve. Mild tricuspid regurgitation. Normal pulmonic valve. No pulmonic regurgitation.\par Pericardium/Pleura: Thickened pericardium. Hemodynamic: Estimated right ventricular systolic pressure equals 39 mm Hg, assuming right atrial pressure equals 8 mm Hg, consistent with borderline pulmonary hypertension. Estimated right ventricular systolic pressure equals 41.36 - 46.36 mm Hg, assuming right atrial pressure equals 10 - 15 mm Hg, consistent with borderline pulmonary hypertension.\par  [de-identified] : 10/18/2021 non-occlusive coronary disease

## 2021-11-07 NOTE — END OF VISIT
[FreeTextEntry3] : I have reviewed the above and agree with all pertinent clinical information including history and physical examination and agree with treatment plan.\par \par  87-year-old female presents to the ED status post mechanical fall 3 days ago and now reports of back pain, left-sided rib pain. Patient was evaluated by the initial team who ordered labs, CT imaging, x-ray of the pelvis. Labs reviewed by me, values noted to be within normal limits. CTH neg for ich, midline shift, or hydrocephalus. CT chest abdomen pelvis revealed an acute displaced left posterior 12th rib fracture and a nondisplaced L1 transverse process fracture. Patient was placed on fall precautions and given Tylenol for pain relief. Neurosurgery consulted with no acute neurosurgical intervention with recommendations for TLSO brace. Case discussed with trauma surgery final recommendations for admission to surgical service for traumatic injuries.

## 2021-12-08 ENCOUNTER — APPOINTMENT (OUTPATIENT)
Dept: CARDIOLOGY | Facility: CLINIC | Age: 76
End: 2021-12-08
Payer: MEDICARE

## 2021-12-08 ENCOUNTER — NON-APPOINTMENT (OUTPATIENT)
Age: 76
End: 2021-12-08

## 2021-12-08 VITALS
OXYGEN SATURATION: 100 % | HEIGHT: 60 IN | HEART RATE: 61 BPM | SYSTOLIC BLOOD PRESSURE: 146 MMHG | BODY MASS INDEX: 21.2 KG/M2 | DIASTOLIC BLOOD PRESSURE: 82 MMHG | WEIGHT: 108 LBS

## 2021-12-08 PROCEDURE — 99214 OFFICE O/P EST MOD 30 MIN: CPT

## 2021-12-08 PROCEDURE — 93000 ELECTROCARDIOGRAM COMPLETE: CPT

## 2021-12-08 NOTE — CARDIOLOGY SUMMARY
[de-identified] : 11/4/2021 sinus rhythm, left axis, deep, broad T-wave inversion diffusely consistent with known Takotsubo cardiomyopathy.\par 12/8/2021 sinus rhythm, left axis, persistent T-wave abnormality, but less prominent. [de-identified] : 10/16/2021 EF (Visual Estimate): 25-30 %, Mitral annular calcification.  Thickened mitral valve. Normal trileaflet aortic valve. Peak transaortic valve gradient equals 9 mm Hg, mean transaortic valve gradient equals 6 mm Hg, aortic valve velocity time integral equals 27 cm. No aortic valve regurgitation seen. Peak left ventricular outflow tract gradient equals 9 mm Hg, mean gradient is equal to 6 mm Hg, LVOT velocity time integral equals 27 cm. Aortic Root: 2.6 cm. Left Atrium: Normal left atrium. Left Ventricle: Severe segmental left ventricular systolic dysfunction. The basal segments are hyperdynamic with apical akinesis.  The mid anterior septum, the apical inferior wall, the apical anterior wall, the apical septum, the apical lateral wall, and the mid inferoseptum are akinetic.  Normal left ventricular internal dimensions and wall thicknesses. Right Heart: Normal right atrium. Right ventricular enlargement with normal right ventricular systolic function. Normal tricuspid valve. Mild tricuspid regurgitation. Normal pulmonic valve. No pulmonic regurgitation.\par Pericardium/Pleura: Thickened pericardium. Hemodynamic: Estimated right ventricular systolic pressure equals 39 mm Hg, assuming right atrial pressure equals 8 mm Hg, consistent with borderline pulmonary hypertension. Estimated right ventricular systolic pressure equals 41.36 - 46.36 mm Hg, assuming right atrial pressure equals 10 - 15 mm Hg, consistent with borderline pulmonary hypertension.\par  [de-identified] : 10/18/2021 non-occlusive coronary disease

## 2021-12-08 NOTE — PHYSICAL EXAM
[Shifted Left] : shifted to the left and inferior [Normal Rate] : normal [Rhythm Regular] : regular [Normal S1] : normal S1 [Normal S2] : normal S2 [No Murmur] : no murmurs heard [No Pitting Edema] : no pitting edema present [Rt] : varicose veins of the right leg noted [Lt] : varicose veins of the left leg noted [2+] : left 2+ [1+] : left 1+ [No Abnormalities] : the abdominal aorta was not enlarged and no bruit was heard [Palpated Width ___ (cm)] : palpated width of [unfilled]Ucm [Normal] : alert and oriented, normal memory [Elevated JVD ____cm] : elevated JVD ~Vcm [Right Carotid Bruit] : no bruit heard over the right carotid [Left Carotid Bruit] : no bruit heard over the left carotid [Bruit] : no bruit heard

## 2021-12-08 NOTE — DISCUSSION/SUMMARY
[Medication Changes Per Orders] : Medication changes are as documented in orders [Coronary Artery Disease] : coronary artery disease [Essential Hypertension] : essential hypertension [Patient] : the patient [Improving] : improving [Responding to Treatment] : responding to treatment [None] : There are no changes in medication management [de-identified] : Takotsubo cardiomyopathy [de-identified] : Non-obstructive plaque, but definite atherosclerosis [de-identified] : previously explained why she must be on atorvastatin which she did not take after hospital discharge [de-identified] : stopped amlodipine in hospital when blood pressure was low. then blood pressure elevated and resumed amlodipine

## 2021-12-08 NOTE — HISTORY OF PRESENT ILLNESS
[FreeTextEntry1] : Mrs. Sarah Stubbs is a 76 year old woman with atypical symptoms, now without chest pain, denies dyspnea. 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 Optimized regimen with increased beta blocker, discontinued amlodipine. Continued lisinopril. Heparin infusion discontinued, but observing severity of apical ballooning started apixaban 5 mg BID until LV function improves. Continuing follow up as outpatient.\par \par Since discharge feeling well, fully active, no chest pain, no dyspnea, no limitation of any activities.

## 2022-01-11 ENCOUNTER — RX RENEWAL (OUTPATIENT)
Age: 77
End: 2022-01-11

## 2022-01-19 ENCOUNTER — APPOINTMENT (OUTPATIENT)
Dept: CARDIOLOGY | Facility: CLINIC | Age: 77
End: 2022-01-19
Payer: MEDICARE

## 2022-01-19 ENCOUNTER — NON-APPOINTMENT (OUTPATIENT)
Age: 77
End: 2022-01-19

## 2022-01-19 VITALS
OXYGEN SATURATION: 98 % | BODY MASS INDEX: 21.09 KG/M2 | SYSTOLIC BLOOD PRESSURE: 156 MMHG | WEIGHT: 108 LBS | DIASTOLIC BLOOD PRESSURE: 86 MMHG | HEART RATE: 63 BPM

## 2022-01-19 PROCEDURE — 93306 TTE W/DOPPLER COMPLETE: CPT

## 2022-01-19 PROCEDURE — 93000 ELECTROCARDIOGRAM COMPLETE: CPT

## 2022-01-19 PROCEDURE — 99214 OFFICE O/P EST MOD 30 MIN: CPT

## 2022-01-19 RX ORDER — APIXABAN 5 MG/1
5 TABLET, FILM COATED ORAL
Qty: 180 | Refills: 3 | Status: DISCONTINUED | COMMUNITY
Start: 2021-11-04 | End: 2022-01-19

## 2022-01-19 NOTE — CARDIOLOGY SUMMARY
[de-identified] : 11/4/2021 sinus rhythm, left axis, deep, broad T-wave inversion diffusely consistent with known Takotsubo cardiomyopathy.\par 12/8/2021 sinus rhythm, left axis, persistent T-wave abnormality, but less prominent.\par 1/19/2022 sinus rhythm, left axis, T-wave inversions gradually less marked. [de-identified] : 1/19/2022 normal LV size and function,, no significant valvular pathology. Compared to 10/2021 hospital study Takotsubo cardiomyopathy has resolved.\par 10/16/2021 EF (Visual Estimate): 25-30 %, Mitral annular calcification.  Thickened mitral valve. Normal trileaflet aortic valve. Peak transaortic valve gradient equals 9 mm Hg, mean transaortic valve gradient equals 6 mm Hg, aortic valve velocity time integral equals 27 cm. No aortic valve regurgitation seen. Peak left ventricular outflow tract gradient equals 9 mm Hg, mean gradient is equal to 6 mm Hg, LVOT velocity time integral equals 27 cm. Aortic Root: 2.6 cm. Left Atrium: Normal left atrium. Left Ventricle: Severe segmental left ventricular systolic dysfunction. The basal segments are hyperdynamic with apical akinesis.  The mid anterior septum, the apical inferior wall, the apical anterior wall, the apical septum, the apical lateral wall, and the mid inferoseptum are akinetic.  Normal left ventricular internal dimensions and wall thicknesses. Right Heart: Normal right atrium. Right ventricular enlargement with normal right ventricular systolic function. Normal tricuspid valve. Mild tricuspid regurgitation. Normal pulmonic valve. No pulmonic regurgitation.\par Pericardium/Pleura: Thickened pericardium. Hemodynamic: Estimated right ventricular systolic pressure equals 39 mm Hg, assuming right atrial pressure equals 8 mm Hg, consistent with borderline pulmonary hypertension. Estimated right ventricular systolic pressure equals 41.36 - 46.36 mm Hg, assuming right atrial pressure equals 10 - 15 mm Hg, consistent with borderline pulmonary hypertension.\par  [de-identified] : 10/18/2021 non-occlusive coronary disease

## 2022-01-19 NOTE — DISCUSSION/SUMMARY
[Medication Changes Per Orders] : Medication changes are as documented in orders [Coronary Artery Disease] : coronary artery disease [Hypercholesterolemia] : hypercholesterolemia [Lipids Test Panel] : a fasting lipid profile [Essential Hypertension] : essential hypertension [Responding to Treatment] : responding to treatment [None] : There are no changes in medication management [Patient] : the patient [de-identified] : Takotsubo cardiomyopathy [de-identified] : continue enalapril 10 mg daily, metoprolol ER 50 mg daily, but with resolution of apical ballooning aneurysm will discontinue apixaban [de-identified] : Non-obstructive plaque, but definite atherosclerosis [de-identified] : with LFTs, CPK, ESR next visit [de-identified] : not optimal and consider increase antihypertensive medication dose if elevated next visit [de-identified] : stopped amlodipine in hospital when blood pressure was low. then blood pressure elevated and resumed

## 2022-01-19 NOTE — PHYSICAL EXAM
[Elevated JVD ____cm] : elevated JVD ~Vcm [Shifted Left] : shifted to the left and inferior [Normal Rate] : normal [Rhythm Regular] : regular [Normal S1] : normal S1 [Normal S2] : normal S2 [No Murmur] : no murmurs heard [No Pitting Edema] : no pitting edema present [Rt] : varicose veins of the right leg noted [Lt] : varicose veins of the left leg noted [2+] : left 2+ [1+] : left 1+ [No Abnormalities] : the abdominal aorta was not enlarged and no bruit was heard [Palpated Width ___ (cm)] : palpated width of [unfilled]Ucm [Normal] : alert and oriented, normal memory [Right Carotid Bruit] : no bruit heard over the right carotid [Left Carotid Bruit] : no bruit heard over the left carotid [Bruit] : no bruit heard

## 2022-01-25 ENCOUNTER — APPOINTMENT (OUTPATIENT)
Dept: UROGYNECOLOGY | Facility: CLINIC | Age: 77
End: 2022-01-25
Payer: MEDICARE

## 2022-01-25 DIAGNOSIS — N89.8 OTHER SPECIFIED NONINFLAMMATORY DISORDERS OF VAGINA: ICD-10-CM

## 2022-01-25 PROCEDURE — 99213 OFFICE O/P EST LOW 20 MIN: CPT

## 2022-01-25 NOTE — HISTORY OF PRESENT ILLNESS
[FreeTextEntry1] : Sarah, 77y/o presents to the office for follow up on pelvic prolapse.  Hx of cystocele and supported with Luis A LS # 2 3/4.  She is happy with support.  Denies any trouble with urination or moving BM.  Pt using Replens when she remembers.\par

## 2022-01-25 NOTE — PHYSICAL EXAM
[No Acute Distress] : in no acute distress [Well developed] : well developed [Well Nourished] : ~L well nourished [Good Hygeine] : demonstrates good hygeine [Oriented x3] : oriented to person, place, and time [Normal Memory] : ~T memory was ~L unimpaired [Normal Mood/Affect] : mood and affect are normal [Soft, Nontender] : the abdomen was soft and nontender [Warm and Dry] : was warm and dry to touch [Normal Gait] : gait was normal [Vulvar Atrophy] : vulvar atrophy [Labia Majora] : were normal [Normal] : was normal [Dry Mucosa] : dry mucosa [Cystocele] : a cystocele [No Bleeding] : there was no active vaginal bleeding [de-identified] : Mild friable introitus with removal of pessary.  no bleeding noted.

## 2022-03-11 RX ORDER — CHOLECALCIFEROL (VITAMIN D3) 125 MCG
0 CAPSULE ORAL
Qty: 0 | Refills: 0 | DISCHARGE

## 2022-03-11 RX ORDER — AMLODIPINE BESYLATE 2.5 MG/1
1 TABLET ORAL
Qty: 0 | Refills: 0 | DISCHARGE

## 2022-03-11 RX ORDER — ASCORBIC ACID 60 MG
0 TABLET,CHEWABLE ORAL
Qty: 0 | Refills: 0 | DISCHARGE

## 2022-04-20 ENCOUNTER — NON-APPOINTMENT (OUTPATIENT)
Age: 77
End: 2022-04-20

## 2022-04-20 ENCOUNTER — APPOINTMENT (OUTPATIENT)
Dept: CARDIOLOGY | Facility: CLINIC | Age: 77
End: 2022-04-20
Payer: MEDICARE

## 2022-04-20 VITALS
DIASTOLIC BLOOD PRESSURE: 100 MMHG | BODY MASS INDEX: 21.09 KG/M2 | HEART RATE: 53 BPM | OXYGEN SATURATION: 100 % | WEIGHT: 108 LBS | SYSTOLIC BLOOD PRESSURE: 180 MMHG

## 2022-04-20 VITALS — DIASTOLIC BLOOD PRESSURE: 90 MMHG | SYSTOLIC BLOOD PRESSURE: 168 MMHG | HEART RATE: 56 BPM

## 2022-04-20 PROBLEM — I10 ESSENTIAL (PRIMARY) HYPERTENSION: Chronic | Status: ACTIVE | Noted: 2021-10-16

## 2022-04-20 PROCEDURE — 99214 OFFICE O/P EST MOD 30 MIN: CPT

## 2022-04-20 PROCEDURE — 93000 ELECTROCARDIOGRAM COMPLETE: CPT

## 2022-04-20 NOTE — DISCUSSION/SUMMARY
[Coronary Artery Disease] : coronary artery disease [Hypercholesterolemia] : hypercholesterolemia [Lipids Test Panel] : a fasting lipid profile [Essential Hypertension] : essential hypertension [Responding to Treatment] : responding to treatment [None] : There are no changes in medication management [Exercise Regimen] : an exercise regimen [Low Sodium Diet] : low sodium diet [Patient] : the patient [de-identified] : LAHB [de-identified] : continue enalapril 10 mg daily, metoprolol ER 50 mg daily, but discontinued apixaban 3 months ago once apical ballooning resolved [de-identified] : Non-obstructive plaque, but definite atherosclerosis [de-identified] : with LFTs, CPK, ESR next visit [de-identified] : again explained importance of statin therapy since has definite atherosclerotic plaque even though non-obstructive [de-identified] : not optimal, incluidng her checks at home in evening reportedly in range of 140/85. Recommend increase antihypertensive medication as clearly elevated in office, but she is very reluctant and wishes to make lifestyle modifications

## 2022-04-20 NOTE — CARDIOLOGY SUMMARY
[de-identified] : 11/4/2021 sinus rhythm, left axis, deep, broad T-wave inversion diffusely consistent with known Takotsubo cardiomyopathy.\par 12/8/2021 sinus rhythm, left axis, persistent T-wave abnormality, but less prominent.\par 1/19/2022 sinus rhythm, left axis, T-wave inversions gradually less marked.\par 4/20/2022  [de-identified] : 10/18/2021 non-occlusive coronary disease [de-identified] : 1/19/2022 normal LV size and function,, no significant valvular pathology. Compared to 10/2021 hospital study Takotsubo cardiomyopathy has resolved.\par 10/16/2021 EF (Visual Estimate): 25-30 %, Mitral annular calcification.  Thickened mitral valve. Normal trileaflet aortic valve. Peak transaortic valve gradient equals 9 mm Hg, mean transaortic valve gradient equals 6 mm Hg, aortic valve velocity time integral equals 27 cm. No aortic valve regurgitation seen. Peak left ventricular outflow tract gradient equals 9 mm Hg, mean gradient is equal to 6 mm Hg, LVOT velocity time integral equals 27 cm. Aortic Root: 2.6 cm. Left Atrium: Normal left atrium. Left Ventricle: Severe segmental left ventricular systolic dysfunction. The basal segments are hyperdynamic with apical akinesis.  The mid anterior septum, the apical inferior wall, the apical anterior wall, the apical septum, the apical lateral wall, and the mid inferoseptum are akinetic.  Normal left ventricular internal dimensions and wall thicknesses. Right Heart: Normal right atrium. Right ventricular enlargement with normal right ventricular systolic function. Normal tricuspid valve. Mild tricuspid regurgitation. Normal pulmonic valve. No pulmonic regurgitation.\par Pericardium/Pleura: Thickened pericardium. Hemodynamic: Estimated right ventricular systolic pressure equals 39 mm Hg, assuming right atrial pressure equals 8 mm Hg, consistent with borderline pulmonary hypertension. Estimated right ventricular systolic pressure equals 41.36 - 46.36 mm Hg, assuming right atrial pressure equals 10 - 15 mm Hg, consistent with borderline pulmonary hypertension.\par

## 2022-04-20 NOTE — PHYSICAL EXAM
[Elevated JVD ____cm] : elevated JVD ~Vcm [Shifted Left] : shifted to the left and inferior [Normal Rate] : normal [Rhythm Regular] : regular [Normal S2] : normal S2 [Normal S1] : normal S1 [No Murmur] : no murmurs heard [No Pitting Edema] : no pitting edema present [Rt] : varicose veins of the right leg noted [Lt] : varicose veins of the left leg noted [2+] : left 2+ [1+] : left 1+ [No Abnormalities] : the abdominal aorta was not enlarged and no bruit was heard [Palpated Width ___ (cm)] : palpated width of [unfilled]Ucm [Normal] : alert and oriented, normal memory [Right Carotid Bruit] : no bruit heard over the right carotid [Left Carotid Bruit] : no bruit heard over the left carotid [Bruit] : no bruit heard

## 2022-04-20 NOTE — HISTORY OF PRESENT ILLNESS
[FreeTextEntry1] : Mrs. Sarah Stubbs is a 76 year old woman with atypical symptoms, now without chest pain, denies dyspnea. 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 Optimized regimen with increased beta blocker, discontinued amlodipine. Continued lisinopril. Heparin infusion discontinued, but observing severity of apical ballooning started apixaban 5 mg BID until LV function improves. Continuing follow up as outpatient.\par \par After discharge feeling well, fully active, no chest pain, no dyspnea, no limitation of any activities. Continues feeling well, though somewhat less energy than in past. Works in garden and in nice weather walks in park.

## 2022-04-26 ENCOUNTER — APPOINTMENT (OUTPATIENT)
Dept: UROGYNECOLOGY | Facility: CLINIC | Age: 77
End: 2022-04-26

## 2022-04-26 ENCOUNTER — APPOINTMENT (OUTPATIENT)
Dept: UROGYNECOLOGY | Facility: CLINIC | Age: 77
End: 2022-04-26
Payer: MEDICARE

## 2022-04-26 PROCEDURE — 99213 OFFICE O/P EST LOW 20 MIN: CPT

## 2022-05-08 NOTE — ED PROVIDER NOTE - SEPSIS ALERT QUESTION 1
This patient was evaluated for sepsis.  At this time, a diagnosis of sepsis is not supported by the overall clinical picture.
5

## 2022-08-16 ENCOUNTER — APPOINTMENT (OUTPATIENT)
Dept: UROGYNECOLOGY | Facility: CLINIC | Age: 77
End: 2022-08-16

## 2022-08-16 VITALS — TEMPERATURE: 96.9 F

## 2022-08-16 PROCEDURE — 99213 OFFICE O/P EST LOW 20 MIN: CPT

## 2022-08-22 ENCOUNTER — RX RENEWAL (OUTPATIENT)
Age: 77
End: 2022-08-22

## 2022-08-29 ENCOUNTER — RX RENEWAL (OUTPATIENT)
Age: 77
End: 2022-08-29

## 2022-10-12 ENCOUNTER — RX RENEWAL (OUTPATIENT)
Age: 77
End: 2022-10-12

## 2022-11-02 ENCOUNTER — NON-APPOINTMENT (OUTPATIENT)
Age: 77
End: 2022-11-02

## 2022-11-02 ENCOUNTER — APPOINTMENT (OUTPATIENT)
Dept: CARDIOLOGY | Facility: CLINIC | Age: 77
End: 2022-11-02

## 2022-11-02 VITALS
SYSTOLIC BLOOD PRESSURE: 168 MMHG | WEIGHT: 109 LBS | HEART RATE: 57 BPM | DIASTOLIC BLOOD PRESSURE: 98 MMHG | OXYGEN SATURATION: 98 % | BODY MASS INDEX: 21.29 KG/M2

## 2022-11-02 PROCEDURE — 99214 OFFICE O/P EST MOD 30 MIN: CPT

## 2022-11-02 PROCEDURE — 93000 ELECTROCARDIOGRAM COMPLETE: CPT

## 2022-11-02 NOTE — CARDIOLOGY SUMMARY
[de-identified] : 11/4/2021 sinus rhythm, left axis, deep, broad T-wave inversion diffusely consistent with known Takotsubo cardiomyopathy.\par 12/8/2021 sinus rhythm, left axis, persistent T-wave abnormality, but less prominent.\par 1/19/2022 sinus rhythm, left axis, T-wave inversions gradually less marked.\par 4/20/2022 \par 11/2/2022 sinus rhythm, left axis. [de-identified] : 1/19/2022 normal LV size and function,, no significant valvular pathology. Compared to 10/2021 hospital study Takotsubo cardiomyopathy has resolved.\par 10/16/2021 EF (Visual Estimate): 25-30 %, Mitral annular calcification.  Thickened mitral valve. Normal trileaflet aortic valve. Peak transaortic valve gradient equals 9 mm Hg, mean transaortic valve gradient equals 6 mm Hg, aortic valve velocity time integral equals 27 cm. No aortic valve regurgitation seen. Peak left ventricular outflow tract gradient equals 9 mm Hg, mean gradient is equal to 6 mm Hg, LVOT velocity time integral equals 27 cm. Aortic Root: 2.6 cm. Left Atrium: Normal left atrium. Left Ventricle: Severe segmental left ventricular systolic dysfunction. The basal segments are hyperdynamic with apical akinesis.  The mid anterior septum, the apical inferior wall, the apical anterior wall, the apical septum, the apical lateral wall, and the mid inferoseptum are akinetic.  Normal left ventricular internal dimensions and wall thicknesses. Right Heart: Normal right atrium. Right ventricular enlargement with normal right ventricular systolic function. Normal tricuspid valve. Mild tricuspid regurgitation. Normal pulmonic valve. No pulmonic regurgitation.\par Pericardium/Pleura: Thickened pericardium. Hemodynamic: Estimated right ventricular systolic pressure equals 39 mm Hg, assuming right atrial pressure equals 8 mm Hg, consistent with borderline pulmonary hypertension. Estimated right ventricular systolic pressure equals 41.36 - 46.36 mm Hg, assuming right atrial pressure equals 10 - 15 mm Hg, consistent with borderline pulmonary hypertension.\par  [de-identified] : 10/18/2021 non-occlusive coronary disease

## 2022-11-02 NOTE — HISTORY OF PRESENT ILLNESS
[FreeTextEntry1] : Mrs. Sarah Stubbs is a 76 year old woman with atypical symptoms, now without chest pain, denies dyspnea. 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 Optimized regimen with increased beta blocker, discontinued amlodipine. Continued lisinopril. Heparin infusion discontinued, but observing severity of apical ballooning started apixaban 5 mg BID until LV function improves. Continuing follow up as outpatient.\par \par After discharge feeling well, fully active, no chest pain, no dyspnea, no limitation of any activities. Continues feeling well, active, walking regularly in warm weather and works in garden all without chest pain, dyspjnea or palpitations.

## 2022-11-02 NOTE — DISCUSSION/SUMMARY
[Patient] : the patient [EKG obtained to assist in diagnosis and management of assessed problem(s)] : EKG obtained to assist in diagnosis and management of assessed problem(s) [Coronary Artery Disease] : coronary artery disease [Hypercholesterolemia] : hypercholesterolemia [Lipids Test Panel] : a fasting lipid profile [Essential Hypertension] : essential hypertension [Responding to Treatment] : responding to treatment [None] : There are no changes in medication management [Exercise Regimen] : an exercise regimen [Low Sodium Diet] : low sodium diet [de-identified] : Takotsubo cardiomyopathy [de-identified] : continue enalapril 10 mg daily, metoprolol ER 50 mg daily, but discontinued apixaban after resolution of apical ballooning [de-identified] : Non-obstructive plaque, but definite atherosclerosis [de-identified] : with LFTs, but she prefers to have done by internist at visit in couple months [de-identified] : again explained importance of statin therapy since has definite atherosclerotic plaque even though non-obstructive and her concern about hair thinning is unlikely to improve off medication [de-identified] : elevated, but ln checks at home much more favorable such as 124/83 this morning [de-identified] : on good regimen and she is reluctant to make any increases

## 2022-11-08 ENCOUNTER — APPOINTMENT (OUTPATIENT)
Dept: UROGYNECOLOGY | Facility: CLINIC | Age: 77
End: 2022-11-08

## 2022-11-08 PROCEDURE — 99213 OFFICE O/P EST LOW 20 MIN: CPT

## 2022-11-08 NOTE — PHYSICAL EXAM
[No Acute Distress] : in no acute distress [Well developed] : well developed [Well Nourished] : ~L well nourished [Good Hygeine] : demonstrates good hygeine [Oriented x3] : oriented to person, place, and time [Normal Memory] : ~T memory was ~L unimpaired [Normal Mood/Affect] : mood and affect are normal [Soft, Nontender] : the abdomen was soft and nontender [Warm and Dry] : was warm and dry to touch [Normal Gait] : gait was normal [Vulvar Atrophy] : vulvar atrophy [Labia Majora] : were normal [Normal] : was normal [Dry Mucosa] : dry mucosa [Cystocele] : a cystocele [Discharge] : a  ~M vaginal discharge was present [Scant] : scant [Anna Marie] : yellow [Thin] : thin [No Bleeding] : there was no active vaginal bleeding [Anxiety] : patient is not anxious

## 2022-11-08 NOTE — DISCUSSION/SUMMARY
[FreeTextEntry1] : Pelvic prolapse:\par -Continue with Gellhorn LS # 2 3/4.\par -Precautions and instructions were reviewed.\par \par Vaginal atrophy:\par -Continue use of Replens.\par \par Follow up in 3 months or sooner for pelvic prolapse.  IF pt have any problems/concern to call office.  PT aware and agrees.

## 2022-11-08 NOTE — PROCEDURE
[Good Fit] : fits well [Discharge] : there is vaginal discharge [Pessary Inserted] : inserted [Pessary Washed] : washed [None] : no bleeding [Medication Review] : Medicaiton Review: Patient verbalizes understanding of risks and benefits [Refit] : refit is not needed [Erosion] : no evidence of erosion [Erythema] : no erythema [Infection] : no evidence of infection [FreeTextEntry1] : Luis A GIBBS # 2 3/4. [de-identified] : scant yellow discharge [FreeTextEntry3] : Replens [FreeTextEntry8] : Reinforced daily pericare.

## 2022-11-08 NOTE — HISTORY OF PRESENT ILLNESS
[FreeTextEntry1] : Sarah is a 76 y/o who presents to the office for follow up on pelvic prolapse.  Prolapse is supported with Gellhorn LS # 2 3/4.  She is happy with support.  Denies any trouble with urination or moving her bowels. She denies any vaginal bleeding, pelvic pain or discomfort\par

## 2022-11-16 NOTE — ASU PREOP CHECKLIST - NSWEIGHTCALCTOOLDRUG_GEN_A_CORE
FUTURE VISIT INFORMATION      FUTURE VISIT INFORMATION:    Date: 1/24/23    Time: 9:10AM    Location: [facility]  REFERRAL INFORMATION:    Referring provider:  Dr Margoth Blanco     Referring providers clinic:  [facility]     Reason for visit/diagnosis  decrease in hearing, tinnitus, ear pressure, Referred by: Dr Margoth Blanco @ [facility], referred directly to Dr. Baptiste. recs faxed - sched per pt    RECORDS REQUESTED FROM:       Clinic name Comments Records Status Imaging Status    [facility]  10/21/22 referral note from Dr Blanco    recs received 12/23/22  10/21/22 audiogram   2 audios from [facility] (no DOS on reports)  Scanned in [facility]    [facility]   12/23/21 note from Alex Griffiths MD    12/23/21 audiogram scanned in  Other results tab [facility]     [address] [address]    [phone]    MRN: [id number] 1/4/22 MR HEAD BRAIN  [facility]  req 11/16/22 - PACS                       11/16/22 4:16PM called [facility], they can push images. Pending req to [facility] for more recs - Amay  12/14/22 10:16am fax request to [facility] for more rec and audiograms -Nadege  12/23/22 7:34AM recs received, sent to scan - Amay     used

## 2023-02-07 ENCOUNTER — APPOINTMENT (OUTPATIENT)
Dept: UROGYNECOLOGY | Facility: CLINIC | Age: 78
End: 2023-02-07
Payer: MEDICARE

## 2023-02-07 PROCEDURE — 99213 OFFICE O/P EST LOW 20 MIN: CPT

## 2023-02-09 NOTE — PROCEDURE
[Good Fit] : fits well [Discharge] : there is vaginal discharge [Pessary Inserted] : inserted [Pessary Washed] : washed [None] : no bleeding [Medication Review] : Medicaiton Review: Patient verbalizes understanding of risks and benefits [Refit] : refit is not needed [Erosion] : no evidence of erosion [Erythema] : no erythema [Infection] : no evidence of infection [FreeTextEntry1] : Luis A GIBBS # 2 3/4. [de-identified] : scant yellow discharge [FreeTextEntry3] : Replens [FreeTextEntry8] : Reinforced daily pericare.

## 2023-05-09 ENCOUNTER — APPOINTMENT (OUTPATIENT)
Dept: UROGYNECOLOGY | Facility: CLINIC | Age: 78
End: 2023-05-09

## 2023-05-16 ENCOUNTER — APPOINTMENT (OUTPATIENT)
Dept: UROGYNECOLOGY | Facility: CLINIC | Age: 78
End: 2023-05-16
Payer: MEDICARE

## 2023-05-16 PROCEDURE — 99213 OFFICE O/P EST LOW 20 MIN: CPT

## 2023-05-18 ENCOUNTER — NON-APPOINTMENT (OUTPATIENT)
Age: 78
End: 2023-05-18

## 2023-05-18 ENCOUNTER — APPOINTMENT (OUTPATIENT)
Dept: CARDIOLOGY | Facility: CLINIC | Age: 78
End: 2023-05-18
Payer: MEDICARE

## 2023-05-18 VITALS
DIASTOLIC BLOOD PRESSURE: 90 MMHG | OXYGEN SATURATION: 100 % | HEART RATE: 51 BPM | BODY MASS INDEX: 21.6 KG/M2 | WEIGHT: 110 LBS | HEIGHT: 60 IN | SYSTOLIC BLOOD PRESSURE: 160 MMHG

## 2023-05-18 VITALS — HEART RATE: 52 BPM | SYSTOLIC BLOOD PRESSURE: 136 MMHG | DIASTOLIC BLOOD PRESSURE: 80 MMHG

## 2023-05-18 PROCEDURE — 93000 ELECTROCARDIOGRAM COMPLETE: CPT

## 2023-05-18 PROCEDURE — 99214 OFFICE O/P EST MOD 30 MIN: CPT

## 2023-05-18 NOTE — DISCUSSION/SUMMARY
[Patient] : the patient [EKG obtained to assist in diagnosis and management of assessed problem(s)] : EKG obtained to assist in diagnosis and management of assessed problem(s) [Essential Hypertension] : essential hypertension [Exercise Regimen] : an exercise regimen [Low Sodium Diet] : low sodium diet [Hypercholesterolemia] : hypercholesterolemia [Responding to Treatment] : responding to treatment [Lipids Test Panel] : a fasting lipid profile [None] : There are no changes in medication management [de-identified] : Takotsubo cardiomyopathy [de-identified] : continue enalapril 10 mg daily, metoprolol ER 50 mg daily, but discontinued apixaban after resolution of apical ballooning [de-identified] : again explained importance of statin therapy since has definite atherosclerotic plaque even though non-obstructive. In past she was concerne about hair thinning and explained unlikely to improve off medication. Today reads on internet statins interfere with vitamin K, cause calcium removal from bones and deposition into arteries. Explained that respected sources do not support this information. She blames the medication for shoulder pains, back pains, fatigue. She may choose to stop medication. [de-identified] : elevated in office, but checks at home much more favorable such as 119/75 this morning

## 2023-05-18 NOTE — CARDIOLOGY SUMMARY
[de-identified] : 11/4/2021 sinus rhythm, left axis, deep, broad T-wave inversion diffusely consistent with known Takotsubo cardiomyopathy.\par 12/8/2021 sinus rhythm, left axis, persistent T-wave abnormality, but less prominent.\par 1/19/2022 sinus rhythm, left axis, T-wave inversions gradually less marked.\par 4/20/2022 \par 11/2/2022 sinus rhythm, left axis.\par 5/18/2023 sinus rhythm, left axis, unchanged. [de-identified] : 1/19/2022 normal LV size and function,, no significant valvular pathology. Compared to 10/2021 hospital study Takotsubo cardiomyopathy has resolved.\par 10/16/2021 EF (Visual Estimate): 25-30 %, Mitral annular calcification.  Thickened mitral valve. Normal trileaflet aortic valve. Peak transaortic valve gradient equals 9 mm Hg, mean transaortic valve gradient equals 6 mm Hg, aortic valve velocity time integral equals 27 cm. No aortic valve regurgitation seen. Peak left ventricular outflow tract gradient equals 9 mm Hg, mean gradient is equal to 6 mm Hg, LVOT velocity time integral equals 27 cm. Aortic Root: 2.6 cm. Left Atrium: Normal left atrium. Left Ventricle: Severe segmental left ventricular systolic dysfunction. The basal segments are hyperdynamic with apical akinesis.  The mid anterior septum, the apical inferior wall, the apical anterior wall, the apical septum, the apical lateral wall, and the mid inferoseptum are akinetic.  Normal left ventricular internal dimensions and wall thicknesses. Right Heart: Normal right atrium. Right ventricular enlargement with normal right ventricular systolic function. Normal tricuspid valve. Mild tricuspid regurgitation. Normal pulmonic valve. No pulmonic regurgitation.\par Pericardium/Pleura: Thickened pericardium. Hemodynamic: Estimated right ventricular systolic pressure equals 39 mm Hg, assuming right atrial pressure equals 8 mm Hg, consistent with borderline pulmonary hypertension. Estimated right ventricular systolic pressure equals 41.36 - 46.36 mm Hg, assuming right atrial pressure equals 10 - 15 mm Hg, consistent with borderline pulmonary hypertension.\par  [de-identified] : 10/18/2021 non-occlusive coronary disease

## 2023-05-18 NOTE — REVIEW OF SYSTEMS
[Negative] : Heme/Lymph [Feeling Fatigued] : feeling fatigued [Joint Pain] : joint pain [Myalgia] : myalgia

## 2023-05-18 NOTE — HISTORY OF PRESENT ILLNESS
[FreeTextEntry1] : Mrs. Sarah Stubbs is a 77 year old woman with atypical symptoms, denies dyspnea. 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 Optimized regimen with increased beta blocker, discontinued amlodipine. Continued lisinopril. Heparin infusion discontinued, but observing severity of apical ballooning started apixaban 5 mg BID until LV function improved. Continuing follow up as outpatient.\par \par After discharge feeling well, fully active, no chest pain, no dyspnea, no limitation of any activities. Continues feeling well, active, walking regularly in warm weather and works in garden all without chest pain, dyspnea or palpitations.

## 2023-05-25 NOTE — HISTORY OF PRESENT ILLNESS
[FreeTextEntry1] : Sarah is a 78 y/o who presents to the office for follow up on pelvic prolapse.  Prolapse is supported with Gellhorn LS # 2 3/4.  She is happy with support.  Denies any trouble with urination or moving her bowels. She denies any vaginal bleeding, pelvic pain or discomfort\par

## 2023-05-25 NOTE — PROCEDURE
[Good Fit] : fits well [Discharge] : there is vaginal discharge [Pessary Inserted] : inserted [Pessary Washed] : washed [None] : no bleeding [Medication Review] : Medicaiton Review: Patient verbalizes understanding of risks and benefits [Refit] : refit is not needed [Erosion] : no evidence of erosion [Erythema] : no erythema [Infection] : no evidence of infection [FreeTextEntry1] : Luis A GIBBS # 2 3/4. [de-identified] : scant yellow discharge [FreeTextEntry3] : Replens [FreeTextEntry8] : Reinforced daily pericare.

## 2023-08-16 ENCOUNTER — APPOINTMENT (OUTPATIENT)
Dept: UROGYNECOLOGY | Facility: CLINIC | Age: 78
End: 2023-08-16

## 2023-08-28 ENCOUNTER — APPOINTMENT (OUTPATIENT)
Dept: UROGYNECOLOGY | Facility: CLINIC | Age: 78
End: 2023-08-28
Payer: MEDICARE

## 2023-08-28 PROCEDURE — A4562: CPT

## 2023-08-28 PROCEDURE — 99214 OFFICE O/P EST MOD 30 MIN: CPT

## 2023-08-28 NOTE — DISCUSSION/SUMMARY
[FreeTextEntry1] : Pelvic prolapse: -Refitted with Gellhorn LS # 2 1/2. -Precautions and instructions were reviewed.  Vaginal atrophy: -Continue use of Replens.  Follow up in 3 months or sooner for pelvic prolapse.  IF pt have any problems/concern to call office.  PT aware and agrees.

## 2023-08-28 NOTE — HISTORY OF PRESENT ILLNESS
[FreeTextEntry1] : Sarah, 77 y/o presents to the office for follow up on pelvic prolapse, supported with Luis A GIBBS # 2 3/4.  She is doing well with support.  Denies any trouble with urination or moving her bowels. She denies any vaginal bleeding, pelvic pain or discomfort.  Using Replens.

## 2023-08-28 NOTE — PHYSICAL EXAM
[No Acute Distress] : in no acute distress [Well developed] : well developed [Well Nourished] : ~L well nourished [Good Hygeine] : demonstrates good hygeine [Oriented x3] : oriented to person, place, and time [Normal Memory] : ~T memory was ~L unimpaired [Normal Mood/Affect] : mood and affect are normal [Anxiety] : patient is not anxious [Soft, Nontender] : the abdomen was soft and nontender [Warm and Dry] : was warm and dry to touch [Normal Gait] : gait was normal [Vulvar Atrophy] : vulvar atrophy [Labia Majora] : were normal [Normal] : was normal [Dry Mucosa] : dry mucosa [Cystocele] : a cystocele [Discharge] : a  ~M vaginal discharge was present [Scant] : scant [Anna Marie] : yellow [Thin] : thin [No Bleeding] : there was no active vaginal bleeding [de-identified] : Very tight with removal.  Friable introitus, stops with applied pressure.  No active bleeding in vagina.

## 2023-08-28 NOTE — PROCEDURE
[Good Fit] : fit is not good [Refit] : refit needed [Erosion] : no evidence of erosion [Erythema] : no erythema [Discharge] : there is vaginal discharge [Infection] : no evidence of infection [Pessary Inserted] : inserted [None] : no bleeding [Medication Review] : Medicaiton Review: Patient verbalizes understanding of risks and benefits [FreeTextEntry1] : Luis A GIBBS # 2 3/4. [de-identified] : very tight with removal [de-identified] : Luis A LS # 2 1/2. [de-identified] : scant yellow discharge [FreeTextEntry3] : Replens [FreeTextEntry8] : Reinforced daily pericare.

## 2023-11-09 ENCOUNTER — APPOINTMENT (OUTPATIENT)
Dept: CARDIOLOGY | Facility: CLINIC | Age: 78
End: 2023-11-09
Payer: MEDICARE

## 2023-11-09 ENCOUNTER — NON-APPOINTMENT (OUTPATIENT)
Age: 78
End: 2023-11-09

## 2023-11-09 VITALS
HEIGHT: 60 IN | WEIGHT: 105 LBS | OXYGEN SATURATION: 100 % | HEART RATE: 50 BPM | SYSTOLIC BLOOD PRESSURE: 168 MMHG | DIASTOLIC BLOOD PRESSURE: 91 MMHG | BODY MASS INDEX: 20.62 KG/M2 | RESPIRATION RATE: 17 BRPM

## 2023-11-09 VITALS — HEART RATE: 52 BPM | DIASTOLIC BLOOD PRESSURE: 80 MMHG | SYSTOLIC BLOOD PRESSURE: 150 MMHG

## 2023-11-09 PROCEDURE — 99214 OFFICE O/P EST MOD 30 MIN: CPT

## 2023-11-09 PROCEDURE — 93000 ELECTROCARDIOGRAM COMPLETE: CPT

## 2023-11-09 RX ORDER — METOPROLOL SUCCINATE 50 MG/1
50 TABLET, EXTENDED RELEASE ORAL
Qty: 90 | Refills: 3 | Status: ACTIVE | COMMUNITY
Start: 2021-11-04 | End: 1900-01-01

## 2023-11-09 RX ORDER — AMLODIPINE BESYLATE 5 MG/1
5 TABLET ORAL DAILY
Qty: 90 | Refills: 3 | Status: ACTIVE | COMMUNITY
Start: 2021-11-04 | End: 1900-01-01

## 2023-11-09 RX ORDER — ATORVASTATIN CALCIUM 40 MG/1
40 TABLET, FILM COATED ORAL
Qty: 90 | Refills: 3 | Status: DISCONTINUED | COMMUNITY
Start: 2021-11-04 | End: 2023-11-09

## 2023-11-28 ENCOUNTER — APPOINTMENT (OUTPATIENT)
Dept: UROGYNECOLOGY | Facility: CLINIC | Age: 78
End: 2023-11-28
Payer: MEDICARE

## 2023-11-28 PROCEDURE — 99213 OFFICE O/P EST LOW 20 MIN: CPT

## 2024-02-27 ENCOUNTER — APPOINTMENT (OUTPATIENT)
Dept: UROGYNECOLOGY | Facility: CLINIC | Age: 79
End: 2024-02-27

## 2024-03-07 ENCOUNTER — APPOINTMENT (OUTPATIENT)
Dept: UROGYNECOLOGY | Facility: CLINIC | Age: 79
End: 2024-03-07
Payer: MEDICARE

## 2024-03-07 PROCEDURE — 99213 OFFICE O/P EST LOW 20 MIN: CPT

## 2024-03-07 NOTE — HISTORY OF PRESENT ILLNESS
[FreeTextEntry1] : Sarah, 77 y/o presents to the office for follow up on pelvic prolapse, supported with Luis A GIBBS # 2 1/2.  She is doing well with support.  Denies any trouble with urination or moving her /bowels. She denies any vaginal bleeding, pelvic pain or discomfort.  Using Replens when she remembers.

## 2024-03-07 NOTE — PROCEDURE
[Good Fit] : fits well [Erosion] : no evidence of erosion [Refit] : refit is not needed [Erythema] : no erythema [Discharge] : there is vaginal discharge [Pessary Inserted] : inserted [Infection] : no evidence of infection [Pessary Washed] : washed [None] : no bleeding [Medication Review] : Medicaiton Review: Patient verbalizes understanding of risks and benefits [de-identified] : scant yellow discharge [FreeTextEntry1] : Luis A LS # 2 1/2 [FreeTextEntry3] : Replens [FreeTextEntry8] : Reinforced daily pericare.

## 2024-03-07 NOTE — DISCUSSION/SUMMARY
[FreeTextEntry1] : Pelvic prolapse: -Continue with Gellhorn LS # 2 1/2. -Precautions and instructions were reviewed.  Vaginal atrophy: -Continue use of Replens.  Follow up in 3 months or sooner for pelvic prolapse.  If pt have any problems/concern to call office.  Pt aware and agrees.

## 2024-03-07 NOTE — PHYSICAL EXAM
[Well developed] : well developed [No Acute Distress] : in no acute distress [Well Nourished] : ~L well nourished [Oriented x3] : oriented to person, place, and time [Good Hygeine] : demonstrates good hygeine [Normal Memory] : ~T memory was ~L unimpaired [Anxiety] : patient is not anxious [Normal Mood/Affect] : mood and affect are normal [Warm and Dry] : was warm and dry to touch [Normal Gait] : gait was normal [Labia Majora] : were normal [Vulvar Atrophy] : vulvar atrophy [Dry Mucosa] : dry mucosa [Normal] : was normal [Cystocele] : a cystocele [Discharge] : a  ~M vaginal discharge was present [Scant] : scant [Anna Marie] : yellow [Thin] : thin [No Bleeding] : there was no active vaginal bleeding

## 2024-05-02 NOTE — PROVIDER CONTACT NOTE (OTHER) - ASSESSMENT
Pt is AXOX4, refusing Lipitor, education provided on benefits and risks. Pt refused.
VSS /86, HR 67, temp 98.2, 97%, pt on the phone at time of event
Pt a&ox4, VSS, no c/o pain, no s/s bleeding. No heparin gtt running at this time.
VSS, pt laying down during event. pt denies cp, sob, symptomatic arrhythmias,  palpitations.
PAST MEDICAL HISTORY:  Arthritis     Asthma     Celiac disease     Cervical herniated disc     Glaucoma     H/O contact dermatitis     History of tachycardia     HTN (hypertension)     IBS (irritable bowel syndrome)     Sleep apnea     Thickened endometrium

## 2024-05-09 ENCOUNTER — NON-APPOINTMENT (OUTPATIENT)
Age: 79
End: 2024-05-09

## 2024-05-09 ENCOUNTER — APPOINTMENT (OUTPATIENT)
Dept: CARDIOLOGY | Facility: CLINIC | Age: 79
End: 2024-05-09
Payer: MEDICARE

## 2024-05-09 VITALS
RESPIRATION RATE: 17 BRPM | BODY MASS INDEX: 21.01 KG/M2 | HEIGHT: 60 IN | WEIGHT: 107 LBS | SYSTOLIC BLOOD PRESSURE: 150 MMHG | DIASTOLIC BLOOD PRESSURE: 80 MMHG | OXYGEN SATURATION: 100 % | HEART RATE: 62 BPM

## 2024-05-09 DIAGNOSIS — I25.10 ATHEROSCLEROTIC HEART DISEASE OF NATIVE CORONARY ARTERY W/OUT ANGINA PECTORIS: ICD-10-CM

## 2024-05-09 DIAGNOSIS — I51.81 TAKOTSUBO SYNDROME: ICD-10-CM

## 2024-05-09 DIAGNOSIS — E78.00 PURE HYPERCHOLESTEROLEMIA, UNSPECIFIED: ICD-10-CM

## 2024-05-09 DIAGNOSIS — I10 ESSENTIAL (PRIMARY) HYPERTENSION: ICD-10-CM

## 2024-05-09 PROCEDURE — 99214 OFFICE O/P EST MOD 30 MIN: CPT

## 2024-05-09 PROCEDURE — G2211 COMPLEX E/M VISIT ADD ON: CPT

## 2024-05-09 PROCEDURE — 93000 ELECTROCARDIOGRAM COMPLETE: CPT

## 2024-05-09 NOTE — REVIEW OF SYSTEMS
[Feeling Fatigued] : feeling fatigued [Joint Pain] : joint pain [Myalgia] : myalgia [Negative] : Heme/Lymph [Cough] : cough [Wheezing] : wheezing

## 2024-05-09 NOTE — HISTORY OF PRESENT ILLNESS
[FreeTextEntry1] : Mrs. Sarah Stubbs is a 78 year old woman with atypical symptoms, denies dyspnea. 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 Optimized regimen with increased beta blocker, discontinued amlodipine. Continued lisinopril. Heparin infusion discontinued, but observing severity of apical ballooning started apixaban 5 mg BID until LV function improved. Continuing follow up as outpatient.  After discharge feeling well, fully active, no chest pain, no dyspnea, no limitation of any activities. Continues feeling well, active, walking regularly in warm weather and works in garden all without chest pain, dyspnea or palpitations.  Recent upper respiratory infection and on course of prednisone. There has been no fever. She remains busy and active with no recurrence of symptoms from stress cardiomyopathy.

## 2024-05-09 NOTE — CARDIOLOGY SUMMARY
[de-identified] : 11/4/2021 sinus rhythm, left axis, deep, broad T-wave inversion diffusely consistent with known Takotsubo cardiomyopathy. 12/8/2021 sinus rhythm, left axis, persistent T-wave abnormality, but less prominent. 1/19/2022 sinus rhythm, left axis, T-wave inversions gradually less marked. 4/20/2022 11/2/2022 sinus rhythm, left axis. 5/18/2023 sinus rhythm, left axis, unchanged. 11/9/2023 sinus bradycardia, left axis, unchanged. 5/9/2024 sinus rhythm, left axis, unchanged. [de-identified] : 1/19/2022 normal LV size and function,, no significant valvular pathology. Compared to 10/2021 hospital study Takotsubo cardiomyopathy has resolved.\par  10/16/2021 EF (Visual Estimate): 25-30 %, Mitral annular calcification.  Thickened mitral valve. Normal trileaflet aortic valve. Peak transaortic valve gradient equals 9 mm Hg, mean transaortic valve gradient equals 6 mm Hg, aortic valve velocity time integral equals 27 cm. No aortic valve regurgitation seen. Peak left ventricular outflow tract gradient equals 9 mm Hg, mean gradient is equal to 6 mm Hg, LVOT velocity time integral equals 27 cm. Aortic Root: 2.6 cm. Left Atrium: Normal left atrium. Left Ventricle: Severe segmental left ventricular systolic dysfunction. The basal segments are hyperdynamic with apical akinesis.  The mid anterior septum, the apical inferior wall, the apical anterior wall, the apical septum, the apical lateral wall, and the mid inferoseptum are akinetic.  Normal left ventricular internal dimensions and wall thicknesses. Right Heart: Normal right atrium. Right ventricular enlargement with normal right ventricular systolic function. Normal tricuspid valve. Mild tricuspid regurgitation. Normal pulmonic valve. No pulmonic regurgitation.\par  Pericardium/Pleura: Thickened pericardium. Hemodynamic: Estimated right ventricular systolic pressure equals 39 mm Hg, assuming right atrial pressure equals 8 mm Hg, consistent with borderline pulmonary hypertension. Estimated right ventricular systolic pressure equals 41.36 - 46.36 mm Hg, assuming right atrial pressure equals 10 - 15 mm Hg, consistent with borderline pulmonary hypertension.\par   [de-identified] : 10/18/2021 non-occlusive coronary disease

## 2024-05-09 NOTE — DISCUSSION/SUMMARY
[Patient] : the patient [EKG obtained to assist in diagnosis and management of assessed problem(s)] : EKG obtained to assist in diagnosis and management of assessed problem(s) [Hypercholesterolemia] : hypercholesterolemia [Essential Hypertension] : essential hypertension [Responding to Treatment] : responding to treatment [None] : There are no changes in medication management [Exercise Regimen] : an exercise regimen [Low Sodium Diet] : low sodium diet [de-identified] : Takotsubo cardiomyopathy [de-identified] : continue enalapril 10 mg daily, metoprolol ER 50 mg daily, but discontinued apixaban after resolution of apical ballooning [de-identified] : she stopped atorvastatin [de-identified] : consistently mildly elevated  [de-identified] : recommended increase enalapril to 10 mg BID or increase amlodipine to 10 mg daily, but once again she is reluctant and wishes to observe home readings

## 2024-06-14 ENCOUNTER — APPOINTMENT (OUTPATIENT)
Dept: UROGYNECOLOGY | Facility: CLINIC | Age: 79
End: 2024-06-14
Payer: MEDICARE

## 2024-06-14 VITALS
SYSTOLIC BLOOD PRESSURE: 130 MMHG | BODY MASS INDEX: 21.6 KG/M2 | WEIGHT: 110 LBS | DIASTOLIC BLOOD PRESSURE: 65 MMHG | HEART RATE: 56 BPM | HEIGHT: 60 IN

## 2024-06-14 DIAGNOSIS — N81.6 RECTOCELE: ICD-10-CM

## 2024-06-14 DIAGNOSIS — N81.3 COMPLETE UTEROVAGINAL PROLAPSE: ICD-10-CM

## 2024-06-14 DIAGNOSIS — N81.11 CYSTOCELE, MIDLINE: ICD-10-CM

## 2024-06-14 DIAGNOSIS — N95.2 POSTMENOPAUSAL ATROPHIC VAGINITIS: ICD-10-CM

## 2024-06-14 PROCEDURE — G2211 COMPLEX E/M VISIT ADD ON: CPT

## 2024-06-14 PROCEDURE — 99213 OFFICE O/P EST LOW 20 MIN: CPT

## 2024-06-14 NOTE — PHYSICAL EXAM
[No Acute Distress] : in no acute distress [Well developed] : well developed [Well Nourished] : ~L well nourished [Good Hygeine] : demonstrates good hygeine [Oriented x3] : oriented to person, place, and time [Normal Memory] : ~T memory was ~L unimpaired [Normal Mood/Affect] : mood and affect are normal [Warm and Dry] : was warm and dry to touch [Normal Gait] : gait was normal [Vulvar Atrophy] : vulvar atrophy [Labia Majora] : were normal [Normal] : was normal [Dry Mucosa] : dry mucosa [Cystocele] : a cystocele [Discharge] : a  ~M vaginal discharge was present [Scant] : scant [Anna Marie] : yellow [Thin] : thin [No Bleeding] : there was no active vaginal bleeding [Anxiety] : patient is not anxious

## 2024-06-14 NOTE — DISCUSSION/SUMMARY
[FreeTextEntry1] : Pelvic prolapse: -Continue with Gellhorn LS # 2 1/2.  Discussed increasing size if pessary keeps falling out; for now pt is OK with support from the pessary and wants to continue with this size.   -Precautions and instructions were reviewed.  Vaginal atrophy: -Continue use of Replens.  Follow up in 3 months or sooner for pelvic prolapse.  If pt have any problems/concern to call office.  Pt aware and agrees.

## 2024-06-14 NOTE — HISTORY OF PRESENT ILLNESS
[FreeTextEntry1] : Sarah, 79 y/o presents to the office for follow up on pelvic prolapse, supported with Luis A GIBBS # 2 1/2.  She is doing well with support.  She does feel the pessary descending sometimes especially with a lot of activities but pessary does not fall out.  Upon further questioning, pt reports that she has lost some weight for the past year.   Denies any trouble with urination or moving her /bowels. She denies any vaginal bleeding, pelvic pain or discomfort.  Using Replens when she remembers.

## 2024-06-14 NOTE — PROCEDURE
[Good Fit] : fits well [Discharge] : there is vaginal discharge [Pessary Inserted] : inserted [Pessary Washed] : washed [None] : no bleeding [Medication Review] : Medicaiton Review: Patient verbalizes understanding of risks and benefits [Refit] : refit is not needed [Erosion] : no evidence of erosion [Erythema] : no erythema [Infection] : no evidence of infection [FreeTextEntry1] : Luis A LS # 2 1/2 [de-identified] : scant yellow discharge [FreeTextEntry3] : Replens [FreeTextEntry8] : Reinforced daily pericare.

## 2024-09-12 ENCOUNTER — APPOINTMENT (OUTPATIENT)
Dept: UROGYNECOLOGY | Facility: CLINIC | Age: 79
End: 2024-09-12
Payer: MEDICARE

## 2024-09-12 DIAGNOSIS — N95.2 POSTMENOPAUSAL ATROPHIC VAGINITIS: ICD-10-CM

## 2024-09-12 DIAGNOSIS — N81.3 COMPLETE UTEROVAGINAL PROLAPSE: ICD-10-CM

## 2024-09-12 DIAGNOSIS — N81.11 CYSTOCELE, MIDLINE: ICD-10-CM

## 2024-09-12 DIAGNOSIS — N81.6 RECTOCELE: ICD-10-CM

## 2024-09-12 PROCEDURE — A4562: CPT

## 2024-09-12 PROCEDURE — G2211 COMPLEX E/M VISIT ADD ON: CPT

## 2024-09-12 PROCEDURE — 99213 OFFICE O/P EST LOW 20 MIN: CPT

## 2024-09-12 NOTE — PROCEDURE
[Good Fit] : fit is not good [Refit] : refit needed [Erosion] : no evidence of erosion [Erythema] : no erythema [Discharge] : there is vaginal discharge [Infection] : no evidence of infection [Pessary Inserted] : inserted [None] : no bleeding [Medication Review] : Medicaiton Review: Patient verbalizes understanding of risks and benefits [FreeTextEntry1] : Luis A LS # 2 1/2 [de-identified] : RHONDA LS #2 3/4 [de-identified] : scant yellow discharge [FreeTextEntry3] : Replens [FreeTextEntry8] : Reinforced daily pericare.

## 2024-09-12 NOTE — DISCUSSION/SUMMARY
[FreeTextEntry1] : Pelvic prolapse: -Refit with GH LS #2 3/4.  Pt feels comfortable with it place and she knows how to check correct placement by feeling for the knob.  -Precautions and instructions were reviewed.  Vaginal atrophy: -Continue use of Replens.  Follow up in 2-3 weeks or sooner as needed.  If pt have any problems/concern to call office.  Pt aware and agrees.

## 2024-09-12 NOTE — PHYSICAL EXAM
[No Acute Distress] : in no acute distress [Well developed] : well developed [Well Nourished] : ~L well nourished [Good Hygeine] : demonstrates good hygeine [Oriented x3] : oriented to person, place, and time [Normal Memory] : ~T memory was ~L unimpaired [Normal Mood/Affect] : mood and affect are normal [Anxiety] : patient is not anxious [Warm and Dry] : was warm and dry to touch [Normal Gait] : gait was normal [Vulvar Atrophy] : vulvar atrophy [Labia Majora] : were normal [Normal] : was normal [Dry Mucosa] : dry mucosa [Cystocele] : a cystocele [Discharge] : a  ~M vaginal discharge was present [Scant] : scant [Anna Marie] : yellow [Thin] : thin [No Bleeding] : there was no active vaginal bleeding

## 2024-09-12 NOTE — HISTORY OF PRESENT ILLNESS
[FreeTextEntry1] : Sarah, 78 y/o presents to the office for follow up on pelvic prolapse, supported with Luis A GIBBS # 2 1/2.  She is doing well with support.  She does feel the pessary descending sometimes especially with a lot of activities but pessary does not fall out.  Upon further questioning, pt reports that she has lost some weight for the past year.   Denies any trouble with urination or moving her bowels. She denies any vaginal bleeding, pelvic pain or discomfort.  Using Replens when she remembers.

## 2024-09-16 ENCOUNTER — RX RENEWAL (OUTPATIENT)
Age: 79
End: 2024-09-16

## 2024-09-26 ENCOUNTER — APPOINTMENT (OUTPATIENT)
Dept: UROGYNECOLOGY | Facility: CLINIC | Age: 79
End: 2024-09-26
Payer: MEDICARE

## 2024-09-26 VITALS
HEART RATE: 60 BPM | DIASTOLIC BLOOD PRESSURE: 96 MMHG | WEIGHT: 110 LBS | SYSTOLIC BLOOD PRESSURE: 163 MMHG | HEIGHT: 60 IN | BODY MASS INDEX: 21.6 KG/M2

## 2024-09-26 DIAGNOSIS — N81.3 COMPLETE UTEROVAGINAL PROLAPSE: ICD-10-CM

## 2024-09-26 DIAGNOSIS — N81.11 CYSTOCELE, MIDLINE: ICD-10-CM

## 2024-09-26 DIAGNOSIS — N81.6 RECTOCELE: ICD-10-CM

## 2024-09-26 DIAGNOSIS — N95.2 POSTMENOPAUSAL ATROPHIC VAGINITIS: ICD-10-CM

## 2024-09-26 PROCEDURE — G2211 COMPLEX E/M VISIT ADD ON: CPT

## 2024-09-26 PROCEDURE — A4562: CPT

## 2024-09-26 PROCEDURE — 99459 PELVIC EXAMINATION: CPT

## 2024-09-26 PROCEDURE — 99213 OFFICE O/P EST LOW 20 MIN: CPT

## 2024-09-26 NOTE — PROCEDURE
[Refit] : refit needed [Discharge] : there is vaginal discharge [Pessary Inserted] : inserted [None] : no bleeding [Medication Review] : Medicaiton Review: Patient verbalizes understanding of risks and benefits [Good Fit] : fit is not good [Erosion] : no evidence of erosion [Erythema] : no erythema [Infection] : no evidence of infection [FreeTextEntry1] : Luis A GIBBS # 2 3/4 [de-identified] : RHONDA GIBBS #3 [de-identified] : scant yellow discharge [FreeTextEntry3] : Replens [FreeTextEntry8] : Reinforced daily pericare.

## 2024-09-26 NOTE — PHYSICAL EXAM
[Chaperone Present] : A chaperone was present in the examining room during all aspects of the physical examination [[id number]] : A chaperone was present during the pelvic exam. [No Acute Distress] : in no acute distress [Well developed] : well developed [Well Nourished] : ~L well nourished [Good Hygeine] : demonstrates good hygeine [Oriented x3] : oriented to person, place, and time [Normal Memory] : ~T memory was ~L unimpaired [Normal Mood/Affect] : mood and affect are normal [Warm and Dry] : was warm and dry to touch [Normal Gait] : gait was normal [Vulvar Atrophy] : vulvar atrophy [Labia Majora] : were normal [Dry Mucosa] : dry mucosa [Cystocele] : a cystocele [Discharge] : a  ~M vaginal discharge was present [Scant] : scant [Anna Marie] : yellow [Thin] : thin [No Bleeding] : there was no active vaginal bleeding [Normal] : normal [FreeTextEntry2] : LA Cobb [Anxiety] : patient is not anxious

## 2024-09-26 NOTE — HISTORY OF PRESENT ILLNESS
[FreeTextEntry1] : Sarah, 78 y/o presents to the office for follow up on pelvic prolapse, supported with Gellhorn LS # 2 3/4 (increased from  LS #2 1/2 at last visit, 9/12/24).  She is doing well with support and feels that the new pessary is giving more support than the previous one.  She does feel the pessary is still descending sometimes especially with a lot of activities but pessary does not fall out.  She sometimes feels the prolapse descending past the pessary.  Pt reports that she has lost some weight for the past year.  Denies any trouble with urination or moving her bowels. She denies any vaginal bleeding, pelvic pain or discomfort.  Using Replens when she remembers.

## 2024-09-26 NOTE — HISTORY OF PRESENT ILLNESS
[FreeTextEntry1] : Sarah, 80 y/o presents to the office for follow up on pelvic prolapse, supported with Gellhorn LS # 2 3/4 (increased from  LS #2 1/2 at last visit, 9/12/24).  She is doing well with support and feels that the new pessary is giving more support than the previous one.  She does feel the pessary is still descending sometimes especially with a lot of activities but pessary does not fall out.  She sometimes feels the prolapse descending past the pessary.  Pt reports that she has lost some weight for the past year.  Denies any trouble with urination or moving her bowels. She denies any vaginal bleeding, pelvic pain or discomfort.  Using Replens when she remembers.

## 2024-09-26 NOTE — DISCUSSION/SUMMARY
[FreeTextEntry1] : Pelvic prolapse: -Refit with GH LS #3.  Pt feels comfortable with it place and she knows how to check correct placement by feeling for the knob.  -Precautions and instructions were reviewed.  Vaginal atrophy: -Continue use of Replens.  Follow up in 2-3 weeks or sooner as needed.  If pt have any problems/concern to call office.  Pt aware and agrees.

## 2024-09-26 NOTE — PROCEDURE
[Refit] : refit needed [Discharge] : there is vaginal discharge [Pessary Inserted] : inserted [None] : no bleeding [Medication Review] : Medicaiton Review: Patient verbalizes understanding of risks and benefits [Good Fit] : fit is not good [Erosion] : no evidence of erosion [Erythema] : no erythema [Infection] : no evidence of infection [FreeTextEntry1] : Luis A GIBBS # 2 3/4 [de-identified] : RHONDA GIBBS #3 [de-identified] : scant yellow discharge [FreeTextEntry8] : Reinforced daily pericare. [FreeTextEntry3] : Replens

## 2024-09-26 NOTE — PHYSICAL EXAM
[Chaperone Present] : A chaperone was present in the examining room during all aspects of the physical examination [[id number]] : A chaperone was present during the pelvic exam. [No Acute Distress] : in no acute distress [Well developed] : well developed [Well Nourished] : ~L well nourished [Good Hygeine] : demonstrates good hygeine [Oriented x3] : oriented to person, place, and time [Normal Memory] : ~T memory was ~L unimpaired [Normal Mood/Affect] : mood and affect are normal [Warm and Dry] : was warm and dry to touch [Normal Gait] : gait was normal [Vulvar Atrophy] : vulvar atrophy [Labia Majora] : were normal [Dry Mucosa] : dry mucosa [Cystocele] : a cystocele [Discharge] : a  ~M vaginal discharge was present [Scant] : scant [Anna Marie] : yellow [Thin] : thin [No Bleeding] : there was no active vaginal bleeding [Normal] : normal [FreeTextEntry2] : LA Cobb [Anxiety] : patient is not anxious

## 2024-10-17 ENCOUNTER — APPOINTMENT (OUTPATIENT)
Dept: UROGYNECOLOGY | Facility: CLINIC | Age: 79
End: 2024-10-17
Payer: MEDICARE

## 2024-10-17 VITALS
WEIGHT: 114 LBS | HEART RATE: 57 BPM | BODY MASS INDEX: 22.38 KG/M2 | DIASTOLIC BLOOD PRESSURE: 84 MMHG | HEIGHT: 60 IN | SYSTOLIC BLOOD PRESSURE: 146 MMHG

## 2024-10-17 DIAGNOSIS — N95.2 POSTMENOPAUSAL ATROPHIC VAGINITIS: ICD-10-CM

## 2024-10-17 DIAGNOSIS — N81.3 COMPLETE UTEROVAGINAL PROLAPSE: ICD-10-CM

## 2024-10-17 DIAGNOSIS — N81.11 CYSTOCELE, MIDLINE: ICD-10-CM

## 2024-10-17 DIAGNOSIS — N81.6 RECTOCELE: ICD-10-CM

## 2024-10-17 PROCEDURE — 99213 OFFICE O/P EST LOW 20 MIN: CPT

## 2024-10-17 PROCEDURE — G2211 COMPLEX E/M VISIT ADD ON: CPT

## 2024-10-17 PROCEDURE — 99459 PELVIC EXAMINATION: CPT

## 2024-11-07 ENCOUNTER — APPOINTMENT (OUTPATIENT)
Dept: CARDIOLOGY | Facility: CLINIC | Age: 79
End: 2024-11-07
Payer: MEDICARE

## 2024-11-07 ENCOUNTER — NON-APPOINTMENT (OUTPATIENT)
Age: 79
End: 2024-11-07

## 2024-11-07 VITALS — HEART RATE: 56 BPM | DIASTOLIC BLOOD PRESSURE: 80 MMHG | SYSTOLIC BLOOD PRESSURE: 160 MMHG

## 2024-11-07 VITALS
WEIGHT: 108 LBS | BODY MASS INDEX: 21.2 KG/M2 | DIASTOLIC BLOOD PRESSURE: 90 MMHG | SYSTOLIC BLOOD PRESSURE: 160 MMHG | HEIGHT: 60 IN

## 2024-11-07 DIAGNOSIS — I51.81 TAKOTSUBO SYNDROME: ICD-10-CM

## 2024-11-07 DIAGNOSIS — I10 ESSENTIAL (PRIMARY) HYPERTENSION: ICD-10-CM

## 2024-11-07 DIAGNOSIS — E78.00 PURE HYPERCHOLESTEROLEMIA, UNSPECIFIED: ICD-10-CM

## 2024-11-07 PROCEDURE — 93000 ELECTROCARDIOGRAM COMPLETE: CPT

## 2024-11-07 PROCEDURE — G2211 COMPLEX E/M VISIT ADD ON: CPT

## 2024-11-07 PROCEDURE — 99214 OFFICE O/P EST MOD 30 MIN: CPT

## 2024-11-13 ENCOUNTER — RX RENEWAL (OUTPATIENT)
Age: 79
End: 2024-11-13

## 2025-01-16 ENCOUNTER — NON-APPOINTMENT (OUTPATIENT)
Age: 80
End: 2025-01-16

## 2025-01-16 ENCOUNTER — APPOINTMENT (OUTPATIENT)
Dept: UROGYNECOLOGY | Facility: CLINIC | Age: 80
End: 2025-01-16
Payer: MEDICARE

## 2025-01-16 VITALS — HEART RATE: 60 BPM | SYSTOLIC BLOOD PRESSURE: 141 MMHG | DIASTOLIC BLOOD PRESSURE: 87 MMHG

## 2025-01-16 DIAGNOSIS — N81.6 RECTOCELE: ICD-10-CM

## 2025-01-16 DIAGNOSIS — N81.11 CYSTOCELE, MIDLINE: ICD-10-CM

## 2025-01-16 DIAGNOSIS — N39.3 STRESS INCONTINENCE (FEMALE) (MALE): ICD-10-CM

## 2025-01-16 DIAGNOSIS — N81.3 COMPLETE UTEROVAGINAL PROLAPSE: ICD-10-CM

## 2025-01-16 DIAGNOSIS — N95.2 POSTMENOPAUSAL ATROPHIC VAGINITIS: ICD-10-CM

## 2025-01-16 PROCEDURE — 99213 OFFICE O/P EST LOW 20 MIN: CPT

## 2025-01-16 PROCEDURE — G2211 COMPLEX E/M VISIT ADD ON: CPT

## 2025-01-22 NOTE — HISTORY OF PRESENT ILLNESS
[FreeTextEntry1] : Sarah, 75y/o presents to the office for follow up on pelvic prolapse.  Hx of cystocele and supported with Luis A LS # 2 3/4.  She is happy with support.  Denies any trouble with urination or moving BM.  Pt using Replens when she remembers.\par Noticed some staining yesterday but after putting a pad on, did not have any staining.\par She wanted to come to make sure it wasn't caused by her being on blood thinner.  She was hospitalized last week and was just put on blood thinner.  PT have a follow up appt with Cardiologist next week with Dr. Chapman. Price (Do Not Change): 0.00 Detail Level: Simple Instructions: This plan will send the code FBSE to the PM system.  DO NOT or CHANGE the price.

## 2025-04-07 ENCOUNTER — NON-APPOINTMENT (OUTPATIENT)
Age: 80
End: 2025-04-07

## 2025-04-07 ENCOUNTER — APPOINTMENT (OUTPATIENT)
Dept: CARDIOLOGY | Facility: CLINIC | Age: 80
End: 2025-04-07

## 2025-04-07 VITALS — DIASTOLIC BLOOD PRESSURE: 80 MMHG | HEART RATE: 60 BPM | SYSTOLIC BLOOD PRESSURE: 144 MMHG

## 2025-04-07 VITALS
DIASTOLIC BLOOD PRESSURE: 82 MMHG | WEIGHT: 110 LBS | HEIGHT: 60 IN | BODY MASS INDEX: 21.6 KG/M2 | OXYGEN SATURATION: 96 % | SYSTOLIC BLOOD PRESSURE: 150 MMHG | HEART RATE: 62 BPM

## 2025-04-07 DIAGNOSIS — E78.00 PURE HYPERCHOLESTEROLEMIA, UNSPECIFIED: ICD-10-CM

## 2025-04-07 DIAGNOSIS — R55 SYNCOPE AND COLLAPSE: ICD-10-CM

## 2025-04-07 DIAGNOSIS — I10 ESSENTIAL (PRIMARY) HYPERTENSION: ICD-10-CM

## 2025-04-07 DIAGNOSIS — I34.0 NONRHEUMATIC MITRAL (VALVE) INSUFFICIENCY: ICD-10-CM

## 2025-04-07 DIAGNOSIS — I44.4 LEFT ANTERIOR FASCICULAR BLOCK: ICD-10-CM

## 2025-04-07 DIAGNOSIS — I51.81 TAKOTSUBO SYNDROME: ICD-10-CM

## 2025-04-07 PROCEDURE — 93000 ELECTROCARDIOGRAM COMPLETE: CPT

## 2025-04-07 PROCEDURE — 99214 OFFICE O/P EST MOD 30 MIN: CPT | Mod: 25

## 2025-04-09 LAB
ALBUMIN SERPL ELPH-MCNC: 4.8 G/DL
ALP BLD-CCNC: 57 U/L
ALT SERPL-CCNC: 10 U/L
ANION GAP SERPL CALC-SCNC: 13 MMOL/L
AST SERPL-CCNC: 21 U/L
BILIRUB SERPL-MCNC: 0.6 MG/DL
BUN SERPL-MCNC: 18 MG/DL
CALCIUM SERPL-MCNC: 9.8 MG/DL
CHLORIDE SERPL-SCNC: 105 MMOL/L
CHOLEST SERPL-MCNC: 232 MG/DL
CO2 SERPL-SCNC: 23 MMOL/L
CREAT SERPL-MCNC: 0.82 MG/DL
EGFRCR SERPLBLD CKD-EPI 2021: 73 ML/MIN/1.73M2
ESTIMATED AVERAGE GLUCOSE: 103 MG/DL
GLUCOSE SERPL-MCNC: 100 MG/DL
HBA1C MFR BLD HPLC: 5.2 %
HCT VFR BLD CALC: 36.9 %
HDLC SERPL-MCNC: 91 MG/DL
HGB BLD-MCNC: 12.7 G/DL
LDLC SERPL-MCNC: 119 MG/DL
MCHC RBC-ENTMCNC: 32.7 PG
MCHC RBC-ENTMCNC: 34.4 G/DL
MCV RBC AUTO: 95.1 FL
NONHDLC SERPL-MCNC: 141 MG/DL
PLATELET # BLD AUTO: 194 K/UL
POTASSIUM SERPL-SCNC: 4.1 MMOL/L
PROT SERPL-MCNC: 7.6 G/DL
RBC # BLD: 3.88 M/UL
RBC # FLD: 12.7 %
SODIUM SERPL-SCNC: 141 MMOL/L
T4 FREE SERPL-MCNC: 1.4 NG/DL
TRIGL SERPL-MCNC: 130 MG/DL
TSH SERPL-ACNC: 2.36 UIU/ML
WBC # FLD AUTO: 6.84 K/UL

## 2025-04-17 ENCOUNTER — APPOINTMENT (OUTPATIENT)
Dept: UROGYNECOLOGY | Facility: CLINIC | Age: 80
End: 2025-04-17

## 2025-04-17 VITALS — HEIGHT: 60 IN | HEART RATE: 61 BPM | WEIGHT: 110 LBS | BODY MASS INDEX: 21.6 KG/M2

## 2025-04-17 DIAGNOSIS — N81.11 CYSTOCELE, MIDLINE: ICD-10-CM

## 2025-04-17 DIAGNOSIS — N81.6 RECTOCELE: ICD-10-CM

## 2025-04-17 DIAGNOSIS — N81.3 COMPLETE UTEROVAGINAL PROLAPSE: ICD-10-CM

## 2025-04-17 DIAGNOSIS — N95.2 POSTMENOPAUSAL ATROPHIC VAGINITIS: ICD-10-CM

## 2025-04-17 DIAGNOSIS — N39.41 URGE INCONTINENCE: ICD-10-CM

## 2025-04-17 PROCEDURE — 99459 PELVIC EXAMINATION: CPT

## 2025-04-17 PROCEDURE — 99213 OFFICE O/P EST LOW 20 MIN: CPT

## 2025-04-17 PROCEDURE — G2211 COMPLEX E/M VISIT ADD ON: CPT

## 2025-05-08 ENCOUNTER — APPOINTMENT (OUTPATIENT)
Dept: CARDIOLOGY | Facility: CLINIC | Age: 80
End: 2025-05-08

## 2025-05-08 ENCOUNTER — NON-APPOINTMENT (OUTPATIENT)
Age: 80
End: 2025-05-08

## 2025-05-08 VITALS — DIASTOLIC BLOOD PRESSURE: 80 MMHG | HEART RATE: 59 BPM | OXYGEN SATURATION: 99 % | SYSTOLIC BLOOD PRESSURE: 140 MMHG

## 2025-05-08 VITALS — HEART RATE: 60 BPM | DIASTOLIC BLOOD PRESSURE: 76 MMHG | SYSTOLIC BLOOD PRESSURE: 130 MMHG

## 2025-05-08 DIAGNOSIS — E78.00 PURE HYPERCHOLESTEROLEMIA, UNSPECIFIED: ICD-10-CM

## 2025-05-08 DIAGNOSIS — R55 SYNCOPE AND COLLAPSE: ICD-10-CM

## 2025-05-08 DIAGNOSIS — I77.810 THORACIC AORTIC ECTASIA: ICD-10-CM

## 2025-05-08 DIAGNOSIS — I34.0 NONRHEUMATIC MITRAL (VALVE) INSUFFICIENCY: ICD-10-CM

## 2025-05-08 DIAGNOSIS — I44.4 LEFT ANTERIOR FASCICULAR BLOCK: ICD-10-CM

## 2025-05-08 DIAGNOSIS — I10 ESSENTIAL (PRIMARY) HYPERTENSION: ICD-10-CM

## 2025-05-08 DIAGNOSIS — I51.81 TAKOTSUBO SYNDROME: ICD-10-CM

## 2025-05-08 PROCEDURE — 93306 TTE W/DOPPLER COMPLETE: CPT

## 2025-05-08 PROCEDURE — 93000 ELECTROCARDIOGRAM COMPLETE: CPT

## 2025-05-08 PROCEDURE — 99214 OFFICE O/P EST MOD 30 MIN: CPT | Mod: 25

## 2025-07-17 ENCOUNTER — APPOINTMENT (OUTPATIENT)
Dept: UROGYNECOLOGY | Facility: CLINIC | Age: 80
End: 2025-07-17

## 2025-07-17 VITALS — BODY MASS INDEX: 21.6 KG/M2 | HEIGHT: 60 IN | WEIGHT: 110 LBS

## 2025-07-17 PROCEDURE — 99213 OFFICE O/P EST LOW 20 MIN: CPT

## 2025-07-17 PROCEDURE — G2211 COMPLEX E/M VISIT ADD ON: CPT

## 2025-07-17 PROCEDURE — 99459 PELVIC EXAMINATION: CPT

## 2025-08-21 ENCOUNTER — RX RENEWAL (OUTPATIENT)
Age: 80
End: 2025-08-21